# Patient Record
Sex: FEMALE | Race: WHITE | URBAN - METROPOLITAN AREA
[De-identification: names, ages, dates, MRNs, and addresses within clinical notes are randomized per-mention and may not be internally consistent; named-entity substitution may affect disease eponyms.]

---

## 2021-07-15 ENCOUNTER — HOSPITAL ENCOUNTER (OUTPATIENT)
Dept: PHYSICAL THERAPY | Age: 26
Setting detail: THERAPIES SERIES
Discharge: HOME | End: 2021-07-15
Payer: COMMERCIAL

## 2021-07-15 NOTE — FLOWSHEET NOTE
[facility], [facility],  Blanca Berman  [place], [address]  Phone: [phone]  Fax [phone]        Physical Therapy  Cancellation/No-show Note  Patient Name:  Carolina Campbell  :  1995   Date:  7/15/2021  Cancels to date: 1  No-shows to date: 0    For today's appointment patient:  [x] Cancelled  [] Rescheduled appointment  [] No-show     Reason given by patient:  [] Patient ill  [] Conflicting appointment  [x] No transportation    [] Conflict with work  [] No reason given  [] Other:     Comments:      Electronically signed by:  Yeyo Munoz PT, PT

## 2021-07-30 ENCOUNTER — HOSPITAL ENCOUNTER (OUTPATIENT)
Dept: PHYSICAL THERAPY | Age: 26
Setting detail: THERAPIES SERIES
Discharge: HOME | End: 2021-07-30
Payer: COMMERCIAL

## 2021-07-30 NOTE — FLOWSHEET NOTE
[facility], [facility], [address], [address]  Phone: [phone]  Fax [phone]        Physical Therapy  Cancellation/No-show Note  Patient Name:  Darian Snow  :  1995   Date:  2021  Cancels to date: 1  No-shows to date: 1    For today's appointment patient:  [] Cancelled  [] Rescheduled appointment  [x] No-show     Reason given by patient:  [] Patient ill  [] Conflicting appointment  [] No transportation    [] Conflict with work  [] No reason given  [] Other:     Comments:      Electronically signed by:  Ava Zamora, PT, PT

## 2021-08-26 ENCOUNTER — HOSPITAL ENCOUNTER (OUTPATIENT)
Dept: PHYSICAL THERAPY | Age: 26
Setting detail: THERAPIES SERIES
Discharge: HOME | End: 2021-08-26
Payer: COMMERCIAL

## 2021-08-26 PROCEDURE — 97530 THERAPEUTIC ACTIVITIES: CPT

## 2021-08-26 PROCEDURE — 97140 MANUAL THERAPY 1/> REGIONS: CPT

## 2021-08-26 PROCEDURE — 97161 PT EVAL LOW COMPLEX 20 MIN: CPT

## 2021-08-26 RX ORDER — FLUTICASONE PROPIONATE 50 MCG
1 SPRAY, SUSPENSION (ML) NASAL DAILY
COMMUNITY

## 2021-08-26 RX ORDER — ESCITALOPRAM OXALATE 20 MG/1
20 TABLET ORAL DAILY
COMMUNITY

## 2021-08-26 RX ORDER — LORATADINE 10 MG/1
10 TABLET ORAL DAILY
COMMUNITY

## 2021-08-26 NOTE — FLOWSHEET NOTE
[facility], [facility], [address] [place] [address]  Phone: [phone]  Fax [phone]      Physical Therapy Daily Treatment Note    Date:  2021    Patient Name:  Emeli Hogue    :  1995  MRN: [id number]  Restrictions/Precautions:  Universal   Medical/Treatment Diagnosis Information:  · Diagnosis: N39.46 Mixed Urinary Incontinence  Insurance/Certification information:  PT Insurance Information: [facility]  Physician Information:  Referring Practitioner: LUIS Gay  Plan of care signed (Y/N): N  Visit# / total visits:  1/10     Functional Outcome (if applicable):          PFDI: 2677/300      Time in:  10:30am      Timed Treatment: 70min. Total Treatment Time:  85min.  ________________________________________________________________________________________    Pain Level:    /10  SUBJECTIVE:  See eval    OBJECTIVE:     Exercise (TE) Resistance/Repetitions Other comments            PF strengthening                                  PF relaxation                                   Other Treatment:   TA:  Bladder re-training/education:     Bladder Diary: patient educated on importance of filling out bladder diary at home, complete with fluid intake, voids, and leakage when applicable. Voiding: patient educated on normal voiding and urinary cycle and the physiology of bladder control muscles and pelvic floor. The patient was educated on bladder dysfunction as well as SHWETA with urethral involvement. The patient was also educated on prolapse and it's affect on urination and pain. Dietary: patient educated on the \"4Cs\" to reduce bladder irritation and leakage.     Other:    Manual Treatments:   PERFECT        Test/Measurements:  See eval         ASSESSMENT: see eval        Treatment/Activity Tolerance:   [x] Patient tolerated treatment well [] Patient limited by fatique  [] Patient limited by pain [] Patient limited by other medical complications  [] Other:     Goals:      Patient stated goal: \"To not leak\"  []? Progressing: []? Met: []? Not Met: []? Adjusted        Therapist goals for Patient:   Short Term Goals: To be achieved in: 2 weeks  1. Independent in HEP and progression per patient tolerance, in order to prevent future occurrence of presenting issue. []? Progressing: []? Met: []? Not Met: []? Adjusted  2. Patient will have a decrease in urinary incontinence to indicate improvement in pelvic floor strength and relaxation, muscle coordination, and/or bladder retraining.  []? Progressing: []? Met: []? Not Met: []? Adjusted     Long Term Goals: To be achieved in: 10 weeks  1. Disability index score of 100 or less for the POPDI-6, CRADI-8, DAVIDA-6, and PFDI-20 to assist with reaching prior level of function. []? Progressing: []? Met: []? Not Met: []? Adjusted  2. Patient will report ability to tolerate penetration without increase in pain to progress towards intercourse / examinations without pain or limitation. []? Progressing: []? Met: []? Not Met: []? Adjusted  3. Patient will increase PERF score to 4+/5 with endurance of 10sec. to demonstrate increased strength and control over pelvic floor musculature. []? Progressing: []? Met: []? Not Met: []? Adjusted  4. Patient will consistently report using less than 1 pad per day to demonstrate decreased urinary incontinence and improve quality of life. []? Progressing: []? Met: []? Not Met: []? Adjusted  5. Patient will return to functional activities including walking, laughing and coughing without increased symptoms or restriction. []? Progressing: []? Met: []? Not Met: []?  Adjusted                        Plan: [x] Continue per plan of care [] Alter current plan (see comments)   [] Plan of care initiated [] Hold pending MD visit [] Discharge      Plan for Next Session: review diary      Re-Certification Due Date:   Visit 10      Signature:  Ernie Siddiqi, PT, DPT

## 2021-08-26 NOTE — FLOWSHEET NOTE
[facility] and Therapy, [facility], [address], [address]  Phone: [phone]  Fax [phone]                                                        Physical Therapy Certification    Dear Referring Practitioner: LUIS Vang,    We had the pleasure of evaluating the following patient for physical therapy services at [address]. A summary of our findings can be found in the initial assessment below. This includes our plan of care. If you have any questions or concerns regarding these findings, please do not hesitate to contact me at the office phone number checked above. Thank you for the referral.       Physician Signature:_______________________________Date:__________________  By signing above (or electronic signature), therapist's plan is approved by physician      Patient: Gladys Banks   : 1995   MRN: [id number]  Referring Physician: Referring Practitioner: LUIS Vang      Evaluation Date: 2021      Medical Diagnosis Information:  Diagnosis: N39.46 Mixed Urinary Incontinence                                             Insurance information:  Insurance Information: [facility]    Second person requested for examination:  [x] No    [] Yes   If yes, who was present:    Precautions/ Contra-indications: universal     Latex Allergy:  [x]NO      []YES    Preferred Language for Healthcare:   [x]English       []Other:    C-SSRS Triggered by Intake questionnaire (Past 2 wk assessment ):   [] No, Questionnaire did not trigger screening. [x] Yes, Patient intake triggered C-SSRS Screening     [x] Completed, no further action required. [] Completed, PCP notified via Epic      Functional Outcome: PFDI-20: 267/300         SUBJECTIVE: Patient reports \"I have always had some trouble with leakage for as long as I can remember, like even when I was in 6th grade\".   Reports now she has leakage mainly with coughing/sneezing. Reports she has loss of a full stream of urine and not just drops. Reports she does have urinary sensation at times and then will not have any urine come out. Reports she also has urinary leakage on the way to the bathroom or if she has waited too long. Reports stress and urge related incontinence. Reports occasionally she has IBS \"issues\" and has diarrhea. Reports she has hemorrhoids and strains to have bowel movements. Reports \"I always have to have a stock of supplies in case something happens\". Reports \"I do have pressure like I have to pee, but no pee comes out\". \"I sometimes feel like I have a UTI, but I haven't ever had one\". Reports she does have pain with intercourse, does have vaginal dryness as well. Reports pain \"is on the outside\". Reports frequent diagnosis of BV as a teenager. Denies history of sexual abuse/trauma. Urinary frequency? Daytime? YES Nighttime? NO  Bowel problems? Straining   Urinary or bowel leakage? Yes, both  Leakage frequency? Frequently   Protection:    Type: Pads (overnight/extra long)   #changes/day: 3x/day  Pregnancy:   # of pregnancies: 0     Are you having regular menstrual cycles? Just started Birth control   Date of last pap smear? 7/2021       4 week or greater of failed trial of PFPT program?   [x] No    [] Yes    PFPT program as defined by \"Completing 4 weeks of an ordered plan of pelvic muscle exercises designed to increase periurethral muscle strength\".     Relevant Medical History: see medical chart, reviewed with patient     Pain Scale: 0/10     Numbness/Tingling: (B) feet,     Occupation/School: [employer]    Living Status/Prior Level of Function: Prior to this injury / incident, pt was independent with ADLs and IADLs    OBJECTIVE:     Abdominals:  Scarring:   [x] No    [] Yes  Diastasis:   [x] No    [] Yes  Functional stability:   [x] No    [] Yes    Observation:   Posture: WFL   Gait analysis: WFL  Lumbar Mobility: WFL      Special Tests:  Sacroiliac Test:   Gaenslen: negative      Lumbar Spine:   Slump test: negative       Neuro:   Sensation: (B) UEs: intact to light touch   Sensation: (B) LEs: intact to light touch   Anal Sensation:    S5 intact to light touch    S4 intact to light touch    S3 intact to light touch   Reflexes:     Anal: present    Cough: present    Pelvic Floor Exam  External:  Skin Condition: normal   Sensation: intact  Palpation: no point tenderness noted  Tone: normal  Perineal Body Mobility:    Voluntary PFM Contraction: present (normal)   Voluntary PFM Relaxation: present (normal)   Involuntary PFM Contraction/Cough Reflex: present (normal)   Involuntary PFM Relaxation: present (normal)  Perineal Body Descent:   Rest: supported   Bearing down: absent (normal-cephalad)    Internal:  Sensation: intact  Palpation: no point tenderness   Vaginal Vault Size:  WNL  PERFECT:   Power: 4/5   Endurance: 8sec. Repetitions: 2   Fast Twitch: 5   Elevation: present   Co-contraction: present   Timing: present    Pelvic Organ Prolapse: grade 1 cystocele                          [x] Patient history, allergies, meds reviewed. Medical chart reviewed. See intake form. Review Of Systems (ROS):  [x]Performed Review of systems (Integumentary, CardioPulmonary, Neurological) by intake and observation. Intake form has been scanned into medical record. Patient has been instructed to contact their primary care physician regarding ROS issues if not already being addressed at this time.       Co-morbidities/Complexities (which will affect course of rehabilitation):   []None        []Hx of COVID   Arthritic conditions   []Rheumatoid arthritis (M05.9)  []Osteoarthritis (M19.91)  []Gout   Cardiovascular conditions   []Hypertension (I10)  []Hyperlipidemia (E78.5)  []Angina pectoris (I20)  []Atherosclerosis (I70)  []Pacemaker  []Hx of CABG/stent/  cardiac surgeries   Musculoskeletal conditions   []Disc pathology   []Congenital spine pathologies   []Osteoporosis (M81.8)  []Osteopenia (M85.8)  []Scoliosis       Endocrine conditions   []Hypothyroid (E03.9)  []Hyperthyroid Gastrointestinal conditions   []Constipation (L19.38)   Metabolic conditions   []Morbid obesity (E66.01)  []Diabetes type 1(E10.65) or 2 (E11.65)   []Neuropathy (G60.9)     Cardio/Pulmonary conditions   [x]Asthma (J45)  []Coughing   []COPD (J44.9)  []CHF  []A-fib   Psychological Disorders  [x]Anxiety (F41.9)  [x]Depression (F32.9)   []Other:   Developmental Disorders  []Autism (F84.0)  []CP (G80)  []Down Syndrome (Q90.9)  []Developmental delay     Neurological conditions  []Prior Stroke (I69.30)  []Parkinson's (G20)  []Encephalopathy (G93.40)  []MS (G35)  []Post-polio (G14)  []SCI  []TBI  []ALS Other conditions  []Fibromyalgia (M79.7)  []Vertigo  []Syncope  []Kidney Failure  []Cancer      []currently undergoing                treatment  []Pregnancy  []Incontinence   Prior surgeries  []involved limb  []previous spinal surgery  [] section birth  []hysterectomy  []bowel / bladder surgery  []other relevant surgeries   []Other:              Barriers to/and or personal factors that will affect rehab potential:              []Age  []Sex   []Smoker              []Motivation/Lack of Motivation                        []Co-Morbidities              []Cognitive Function, education/learning barriers              []Environmental, home barriers              []profession/work barriers  []past PT/medical experience  []other:  Justification: none    Falls Risk Assessment (30 days):   [x] Falls Risk assessed and no intervention required. [] Falls Risk assessed and Patient requires intervention due to being higher risk   TUG score (>12s at risk):     [] Falls education provided, including         ASSESSMENT: The patient is a 19yo female referred to PT due to urinary incontinence. The patient demonstrated decreased pelvic floor endurance and coordination.   The patient is reporting mixed urinary incontinence. The patient is reporting s/s consistent with IC/PBS. The patient seems very motivated and should improve with PT and HEP compliance.       Functional Impairments:    []Noted lumbar/proximal hip hypomobility  []Noted lumbosacral and/or generalized hypermobility  []Decreased core/proximal hip strength and neuromuscular control   []Decreased LE functional strength  []pelvic/sacral/spinal malalignment   []Increased pain with penetration  [x]Absent/poor control of PF contraction   []Absent/poor control of PF relaxation  [x]Decreased control of bladder  [x]Decreased control of bowel    Functional Activity Limitations (from functional questionnaire and intake)  []Reduced ability to maintain good posture and demonstrate good body mechanics with sitting, bending, and lifting  []Reduced ability to perform lifting, reaching, carrying tasks  [x]Reduced ability to control urine  [x]Reduced ability to control bowel movements   []Reduced ability to ambulate prolonged functional periods/distances/surfaces  []Reduced ability to squat   []Reduced ability to forward bend  []Reduced ability to ascend/descend stairs  []Reduced ability to tolerate penetration/intercourse    Participation Restrictions  [x]Reduced participation in self care activities  [x]Reduced participation in home management activities  [x]Reduced participation in work activities  [x]Reduced participation in social activities  [x]Reduced participation in sport/recreational activities    Classification/Subgrouping:  []signs/symptoms consistent vaginismus/dyspareunia    []signs/symptoms consistent with pelvic floor organ prolapse  [x]signs/symptoms consistent with stress urinary incontinence  [x]signs/symptoms consistent with urge urinary incontinence  []signs/symptoms consistent with bowel incontinence  []signs/symptoms consistent with post-surgical status including decreased ROM, strength and function  []signs/symptoms consistent with other: education on pelvic floor anatomy and function, bladder and bowel anatomy and function, joint protection, postural re-education, activity modification, progression of HEP. HEP instruction: Written HEP instructions provided and reviewed    GOALS:  Patient stated goal: \"To not leak\"  [] Progressing: [] Met: [] Not Met: [] Adjusted      Therapist goals for Patient:   Short Term Goals: To be achieved in: 2 weeks  1. Independent in HEP and progression per patient tolerance, in order to prevent future occurrence of presenting issue. [] Progressing: [] Met: [] Not Met: [] Adjusted  2. Patient will have a decrease in urinary incontinence to indicate improvement in pelvic floor strength and relaxation, muscle coordination, and/or bladder retraining. [] Progressing: [] Met: [] Not Met: [] Adjusted    Long Term Goals: To be achieved in: 10 weeks  1. Disability index score of 100 or less for the POPDI-6, CRADI-8, DAVIDA-6, and PFDI-20 to assist with reaching prior level of function. [] Progressing: [] Met: [] Not Met: [] Adjusted  2. Patient will report ability to tolerate penetration without increase in pain to progress towards intercourse / examinations without pain or limitation. [] Progressing: [] Met: [] Not Met: [] Adjusted  3. Patient will increase PERF score to 4+/5 with endurance of 10sec. to demonstrate increased strength and control over pelvic floor musculature. [] Progressing: [] Met: [] Not Met: [] Adjusted  4. Patient will consistently report using less than 1 pad per day to demonstrate decreased urinary incontinence and improve quality of life. [] Progressing: [] Met: [] Not Met: [] Adjusted  5. Patient will return to functional activities including walking, laughing and coughing without increased symptoms or restriction.    [] Progressing: [] Met: [] Not Met: [] Adjusted       Electronically signed by:  Francie Omalley PT, DPT

## 2021-09-09 ENCOUNTER — HOSPITAL ENCOUNTER (OUTPATIENT)
Dept: PHYSICAL THERAPY | Age: 26
Setting detail: THERAPIES SERIES
Discharge: HOME | End: 2021-09-09
Payer: COMMERCIAL

## 2021-09-09 NOTE — FLOWSHEET NOTE
[facility] [facility] Therapy, [facility], [address], [address]  Phone: [phone]  Fax [phone]        Physical Therapy  Cancellation/No-show Note  Patient Name:  Thi Estrada  :  1995   Date:  2021  Cancels to date: 0  No-shows to date: 1    For today's appointment patient:  [] Cancelled  [] Rescheduled appointment  [x] No-show     Reason given by patient:  [] Patient ill  [] Conflicting appointment  [] No transportation    [] Conflict with work  [] No reason given  [] Other:     Comments:      Electronically signed by:  Juanito Camejo PT

## 2021-09-17 ENCOUNTER — HOSPITAL ENCOUNTER (OUTPATIENT)
Dept: PHYSICAL THERAPY | Age: 26
Setting detail: THERAPIES SERIES
Discharge: HOME | End: 2021-09-17
Payer: COMMERCIAL

## 2021-09-17 NOTE — FLOWSHEET NOTE
[facility] [place] [address] [address], [address]  Phone: [phone]  Fax [phone]        Physical Therapy  Cancellation/No-show Note  Patient Name:  Otilio Diehl  :  1995   Date:  2021  Cancels to date: 1  No-shows to date: 1    For today's appointment patient:  [x] Cancelled  [] Rescheduled appointment  [] No-show     Reason given by patient:  [] Patient ill  [] Conflicting appointment  [] No transportation    [] Conflict with work  [] No reason given  [x] Other:     Comments: Called patient to see if she would be attending today's follow up, patient requested all follow up appointments be cancelled due to personal issues at this time. No goals met, no updated objective information.      Electronically signed by:  Ernie Siddiqi, PT, DPT

## 2023-06-27 ENCOUNTER — OFFICE VISIT (OUTPATIENT)
Age: 28
End: 2023-06-27

## 2023-06-27 VITALS
OXYGEN SATURATION: 98 % | TEMPERATURE: 100.3 F | SYSTOLIC BLOOD PRESSURE: 110 MMHG | DIASTOLIC BLOOD PRESSURE: 72 MMHG | WEIGHT: 213 LBS | BODY MASS INDEX: 37.73 KG/M2 | HEART RATE: 93 BPM

## 2023-06-27 DIAGNOSIS — R50.9 FEVER OF UNKNOWN ORIGIN: Primary | ICD-10-CM

## 2023-06-27 DIAGNOSIS — J02.9 SORE THROAT: ICD-10-CM

## 2023-06-27 LAB — S PYO AG THROAT QL: NORMAL

## 2023-06-27 ASSESSMENT — ENCOUNTER SYMPTOMS
NAUSEA: 0
VOMITING: 0
DIARRHEA: 0
RHINORRHEA: 0
SORE THROAT: 1

## 2024-04-16 ENCOUNTER — HOSPITAL ENCOUNTER (EMERGENCY)
Age: 29
Discharge: HOME | End: 2024-04-16
Attending: EMERGENCY MEDICINE
Payer: COMMERCIAL

## 2024-04-16 VITALS
OXYGEN SATURATION: 100 % | TEMPERATURE: 98 F | SYSTOLIC BLOOD PRESSURE: 106 MMHG | RESPIRATION RATE: 16 BRPM | HEART RATE: 86 BPM | WEIGHT: 165 LBS | DIASTOLIC BLOOD PRESSURE: 68 MMHG | BODY MASS INDEX: 29.23 KG/M2

## 2024-04-16 DIAGNOSIS — K52.9 GASTROENTERITIS: Primary | ICD-10-CM

## 2024-04-16 LAB
ALBUMIN SERPL-MCNC: 4.8 G/DL (ref 3.4–5)
ALBUMIN/GLOB SERPL: 2 {RATIO} (ref 1.1–2.2)
ALP SERPL-CCNC: 67 U/L (ref 40–129)
ALT SERPL-CCNC: 10 U/L (ref 10–40)
ANION GAP SERPL CALCULATED.3IONS-SCNC: 15 MMOL/L (ref 3–16)
AST SERPL-CCNC: 15 U/L (ref 15–37)
BASOPHILS # BLD: 0 K/UL (ref 0–0.2)
BASOPHILS NFR BLD: 0.1 %
BILIRUB SERPL-MCNC: 0.5 MG/DL (ref 0–1)
BILIRUB UR QL STRIP.AUTO: NEGATIVE
BUN SERPL-MCNC: 11 MG/DL (ref 7–20)
CALCIUM SERPL-MCNC: 9.4 MG/DL (ref 8.3–10.6)
CHLORIDE SERPL-SCNC: 101 MMOL/L (ref 99–110)
CLARITY UR: CLEAR
CO2 SERPL-SCNC: 23 MMOL/L (ref 21–32)
COLOR UR: YELLOW
CREAT SERPL-MCNC: 0.6 MG/DL (ref 0.6–1.1)
DEPRECATED RDW RBC AUTO: 15.7 % (ref 12.4–15.4)
EOSINOPHIL # BLD: 0 K/UL (ref 0–0.6)
EOSINOPHIL NFR BLD: 0 %
FLUAV RNA UPPER RESP QL NAA+PROBE: NEGATIVE
FLUBV AG NPH QL: NEGATIVE
GFR SERPLBLD CREATININE-BSD FMLA CKD-EPI: >90 ML/MIN/{1.73_M2}
GLUCOSE SERPL-MCNC: 126 MG/DL (ref 70–99)
GLUCOSE UR STRIP.AUTO-MCNC: NEGATIVE MG/DL
HCG SERPL QL: NEGATIVE
HCT VFR BLD AUTO: 39.5 % (ref 36–48)
HGB BLD-MCNC: 13 G/DL (ref 12–16)
HGB UR QL STRIP.AUTO: NEGATIVE
KETONES UR STRIP.AUTO-MCNC: 15 MG/DL
LEUKOCYTE ESTERASE UR QL STRIP.AUTO: NEGATIVE
LIPASE SERPL-CCNC: 14 U/L (ref 13–60)
LYMPHOCYTES # BLD: 0.3 K/UL (ref 1–5.1)
LYMPHOCYTES NFR BLD: 2.2 %
MCH RBC QN AUTO: 26.2 PG (ref 26–34)
MCHC RBC AUTO-ENTMCNC: 32.9 G/DL (ref 31–36)
MCV RBC AUTO: 79.8 FL (ref 80–100)
MONOCYTES # BLD: 0.3 K/UL (ref 0–1.3)
MONOCYTES NFR BLD: 2.6 %
NEUTROPHILS # BLD: 10.8 K/UL (ref 1.7–7.7)
NEUTROPHILS NFR BLD: 95.1 %
NITRITE UR QL STRIP.AUTO: NEGATIVE
PH UR STRIP.AUTO: 7.5 [PH] (ref 5–8)
PLATELET # BLD AUTO: 294 K/UL (ref 135–450)
PMV BLD AUTO: 9 FL (ref 5–10.5)
POTASSIUM SERPL-SCNC: 3.6 MMOL/L (ref 3.5–5.1)
PROT SERPL-MCNC: 7.2 G/DL (ref 6.4–8.2)
PROT UR STRIP.AUTO-MCNC: NEGATIVE MG/DL
RBC # BLD AUTO: 4.95 M/UL (ref 4–5.2)
SODIUM SERPL-SCNC: 139 MMOL/L (ref 136–145)
SP GR UR STRIP.AUTO: 1.01 (ref 1–1.03)
UA COMPLETE W REFLEX CULTURE PNL UR: ABNORMAL
UA DIPSTICK W REFLEX MICRO PNL UR: ABNORMAL
URN SPEC COLLECT METH UR: ABNORMAL
UROBILINOGEN UR STRIP-ACNC: 0.2 E.U./DL
WBC # BLD AUTO: 11.4 K/UL (ref 4–11)

## 2024-04-16 PROCEDURE — 6360000002 HC RX W HCPCS: Performed by: EMERGENCY MEDICINE

## 2024-04-16 PROCEDURE — 85025 COMPLETE CBC W/AUTO DIFF WBC: CPT

## 2024-04-16 PROCEDURE — 96375 TX/PRO/DX INJ NEW DRUG ADDON: CPT

## 2024-04-16 PROCEDURE — 87635 SARS-COV-2 COVID-19 AMP PRB: CPT

## 2024-04-16 PROCEDURE — 2500000003 HC RX 250 WO HCPCS: Performed by: EMERGENCY MEDICINE

## 2024-04-16 PROCEDURE — 83690 ASSAY OF LIPASE: CPT

## 2024-04-16 PROCEDURE — 2580000003 HC RX 258: Performed by: EMERGENCY MEDICINE

## 2024-04-16 PROCEDURE — 80053 COMPREHEN METABOLIC PANEL: CPT

## 2024-04-16 PROCEDURE — 96374 THER/PROPH/DIAG INJ IV PUSH: CPT

## 2024-04-16 PROCEDURE — A4216 STERILE WATER/SALINE, 10 ML: HCPCS | Performed by: EMERGENCY MEDICINE

## 2024-04-16 PROCEDURE — 99284 EMERGENCY DEPT VISIT MOD MDM: CPT

## 2024-04-16 PROCEDURE — 87804 INFLUENZA ASSAY W/OPTIC: CPT

## 2024-04-16 PROCEDURE — 81003 URINALYSIS AUTO W/O SCOPE: CPT

## 2024-04-16 PROCEDURE — 84703 CHORIONIC GONADOTROPIN ASSAY: CPT

## 2024-04-16 RX ORDER — LOPERAMIDE HYDROCHLORIDE 2 MG/1
2 CAPSULE ORAL 4 TIMES DAILY PRN
Qty: 10 CAPSULE | Refills: 0 | Status: SHIPPED | OUTPATIENT
Start: 2024-04-16 | End: 2024-04-26

## 2024-04-16 RX ORDER — 0.9 % SODIUM CHLORIDE 0.9 %
1000 INTRAVENOUS SOLUTION INTRAVENOUS ONCE
Status: COMPLETED | OUTPATIENT
Start: 2024-04-16 | End: 2024-04-16

## 2024-04-16 RX ORDER — ONDANSETRON HYDROCHLORIDE 8 MG/1
8 TABLET, FILM COATED ORAL EVERY 8 HOURS PRN
Qty: 20 TABLET | Refills: 0 | Status: SHIPPED | OUTPATIENT
Start: 2024-04-16

## 2024-04-16 RX ORDER — CYCLOBENZAPRINE HCL 10 MG
10 TABLET ORAL 3 TIMES DAILY PRN
Qty: 15 TABLET | Refills: 0 | Status: SHIPPED | OUTPATIENT
Start: 2024-04-16 | End: 2024-04-26

## 2024-04-16 RX ORDER — ONDANSETRON 2 MG/ML
4 INJECTION INTRAMUSCULAR; INTRAVENOUS ONCE
Status: COMPLETED | OUTPATIENT
Start: 2024-04-16 | End: 2024-04-16

## 2024-04-16 RX ADMIN — SODIUM CHLORIDE 1000 ML: 9 INJECTION, SOLUTION INTRAVENOUS at 20:56

## 2024-04-16 RX ADMIN — ONDANSETRON 4 MG: 2 INJECTION INTRAMUSCULAR; INTRAVENOUS at 20:56

## 2024-04-16 RX ADMIN — FAMOTIDINE 20 MG: 10 INJECTION, SOLUTION INTRAVENOUS at 20:58

## 2024-04-16 ASSESSMENT — PAIN SCALES - GENERAL
PAINLEVEL_OUTOF10: 4
PAINLEVEL_OUTOF10: 8
PAINLEVEL_OUTOF10: 2

## 2024-04-16 ASSESSMENT — PAIN - FUNCTIONAL ASSESSMENT
PAIN_FUNCTIONAL_ASSESSMENT: 0-10
PAIN_FUNCTIONAL_ASSESSMENT: 0-10

## 2024-04-16 ASSESSMENT — PAIN DESCRIPTION - DESCRIPTORS: DESCRIPTORS: ACHING;CRAMPING

## 2024-04-16 ASSESSMENT — PAIN DESCRIPTION - FREQUENCY: FREQUENCY: CONTINUOUS

## 2024-04-16 ASSESSMENT — PAIN DESCRIPTION - LOCATION: LOCATION: ABDOMEN

## 2024-04-16 ASSESSMENT — PAIN DESCRIPTION - PAIN TYPE: TYPE: ACUTE PAIN

## 2024-04-17 LAB — SARS-COV-2 RNA RESP QL NAA+PROBE: NOT DETECTED

## 2024-04-17 NOTE — ED NOTES
Pt arrived complaining of abdominal pain, n/v/d since yesterday. Pt reports pain 8/10. She is alert and oriented, respirations appear even and unlabored. She does seem slightly anxious and upset due to missing a scheduled doctor's appt. She reports pain, n/v/d beginning approx 5-6 hours after eating a homemade BLT. Pt has not had any emesis since arrival.    20 G PIV placed in right AC according to protocol. Blood samples collected and sent for processing. Pt tolerated well. Medication administered according to policy & physician's orders.    Call light is within reach, pt verbalizes understanding on usage. Pt denies further needs at this time.

## 2024-04-17 NOTE — DISCHARGE INSTRUCTIONS
Discharge home  Flexeril for your leg cramps  Continue your GERD medications  Zofran for nausea  Imodium for diarrhea  Drink plenty of fluids  Follow-up with your family doctor  Follow-up with [facility]

## 2024-04-17 NOTE — ED PROVIDER NOTES
Reviewed   URINALYSIS WITH REFLEX TO CULTURE - Abnormal; Notable for the following components:       Result Value    Ketones, Urine 15 (*)     All other components within normal limits   CBC WITH AUTO DIFFERENTIAL - Abnormal; Notable for the following components:    WBC 11.4 (*)     MCV 79.8 (*)     RDW 15.7 (*)     Neutrophils Absolute 10.8 (*)     Lymphocytes Absolute 0.3 (*)     All other components within normal limits   COMPREHENSIVE METABOLIC PANEL W/ REFLEX TO MG FOR LOW K - Abnormal; Notable for the following components:    Glucose 126 (*)     All other components within normal limits   RAPID INFLUENZA A/B ANTIGENS   HCG, SERUM, QUALITATIVE   LIPASE   COVID-19       All other labs were within normal range or not returned as of this dictation.    EKG: All EKG's are interpreted by the Emergency Department Physician who eithersigns or Co-signs this chart in the absence of a cardiologist.        RADIOLOGY:   Non-plain film images such as CT, Ultrasound and MRI are read by the radiologist. Plain radiographic images are visualized by myself.      *    Interpretation per the Radiologist below, if available at the time of this note:    No orders to display         PROCEDURES   Unless otherwise noted below, none     Procedures    *    CRITICAL CARE TIME   N/A      EMERGENCY DEPARTMENT COURSE and DIFFERENTIALDIAGNOSIS/MDM:   Vitals:    Vitals:    04/16/24 2040 04/16/24 2135   BP: (!) 129/90 120/82   Pulse: 92 89   Resp: 16 16   Temp: 98 °F (36.7 °C)    TempSrc: Oral    SpO2: 99% 100%   Weight: 74.8 kg (165 lb)        Patient was given thefollowing medications:  Medications   sodium chloride 0.9 % bolus 1,000 mL (1,000 mLs IntraVENous New Bag 4/16/24 2056)   ondansetron (ZOFRAN) injection 4 mg (4 mg IntraVENous Given 4/16/24 2056)   famotidine (PEPCID) 20 mg in sodium chloride (PF) 0.9 % 10 mL injection (20 mg IntraVENous Given 4/16/24 2058)           The patient tolerated their visit well.   The patient and / or the

## 2024-05-07 ENCOUNTER — HOSPITAL ENCOUNTER (EMERGENCY)
Age: 29
Discharge: HOME | End: 2024-05-07
Attending: EMERGENCY MEDICINE
Payer: COMMERCIAL

## 2024-05-07 VITALS
BODY MASS INDEX: 28.93 KG/M2 | RESPIRATION RATE: 16 BRPM | OXYGEN SATURATION: 100 % | SYSTOLIC BLOOD PRESSURE: 144 MMHG | DIASTOLIC BLOOD PRESSURE: 81 MMHG | HEART RATE: 91 BPM | TEMPERATURE: 98.1 F | WEIGHT: 163.3 LBS

## 2024-05-07 DIAGNOSIS — L29.0 ANAL ITCHING: Primary | ICD-10-CM

## 2024-05-07 PROCEDURE — 99283 EMERGENCY DEPT VISIT LOW MDM: CPT

## 2024-05-07 RX ORDER — ALBENDAZOLE 200 MG/1
400 TABLET, FILM COATED ORAL ONCE
Qty: 4 TABLET | Refills: 0 | Status: SHIPPED | OUTPATIENT
Start: 2024-05-07 | End: 2024-05-07

## 2024-05-07 RX ORDER — DEXTROAMPHETAMINE SACCHARATE, AMPHETAMINE ASPARTATE, DEXTROAMPHETAMINE SULFATE AND AMPHETAMINE SULFATE 2.5; 2.5; 2.5; 2.5 MG/1; MG/1; MG/1; MG/1
10 TABLET ORAL DAILY
COMMUNITY

## 2024-05-07 ASSESSMENT — PAIN - FUNCTIONAL ASSESSMENT: PAIN_FUNCTIONAL_ASSESSMENT: NONE - DENIES PAIN

## 2024-05-08 NOTE — ED PROVIDER NOTES
EMERGENCY DEPARTMENT ENCOUNTER     [facility] EMERGENCY DEPARTMENT     Pt Name: Rose Coyle   MRN: [id number]   Birthdate 1995   Date of evaluation: 5/7/2024   Provider: Kavita Newell MD   PCP: Tamara Kirby   Note Started: 11:01 PM EDT 5/7/24     CHIEF COMPLAINT     Chief Complaint   Patient presents with    Worms in Stool     Pt states she's had worms in her stool since she was 8 years old. Also has an \"itchy butt hole.\" No pain.        HISTORY OF PRESENT ILLNESS:          Rose Coyle is a 28 y.o. female who presents with a chief complaint of worms in her stools. She has anal pruritus. Last night she had diarrhea. Has had worms in stools she was 8 and she was never treated. She told her mother and she responded negatively and she became embarrassed to tell anyone about that. She has seen the worms occasionally.      Nursing Notes were all reviewed and agreed with or any disagreements were addressed in the HPI.     ROS: Positives and Pertinent negatives as per HPI.    PAST MEDICAL HISTORY     Past medical history:  has a past medical history of Anxiety, Asthma, Depression, GERD (gastroesophageal reflux disease), and Headache.    Past surgical history:  has a past surgical history that includes Tonsillectomy; Tympanostomy tube placement; Adenoidectomy; and Rel of Tongue Tie and Closure with Flap.      PHYSICAL EXAM:  ED Triage Vitals [05/07/24 2243]   BP Temp Temp Source Pulse Respirations SpO2 Height Weight - Scale   (!) 144/81 98.1 °F (36.7 °C) Oral 91 16 100 % -- 74.1 kg (163 lb 4.8 oz)        Physical Exam  Vitals and nursing note reviewed.   Constitutional:       Appearance: Normal appearance. She is well-developed. She is not ill-appearing.   HENT:      Head: Normocephalic and atraumatic.      Right Ear: External ear normal.      Left Ear: External ear normal.      Nose: Nose normal.   Eyes:      General: No scleral icterus.        Right eye: No discharge.         Left eye: No

## 2024-07-02 ENCOUNTER — HOSPITAL ENCOUNTER (EMERGENCY)
Age: 29
Discharge: LEFT BEFORE DISCHARGE | End: 2024-07-02
Attending: EMERGENCY MEDICINE

## 2024-07-02 VITALS
TEMPERATURE: 97.9 F | HEART RATE: 81 BPM | SYSTOLIC BLOOD PRESSURE: 117 MMHG | DIASTOLIC BLOOD PRESSURE: 85 MMHG | WEIGHT: 163.14 LBS | OXYGEN SATURATION: 99 % | HEIGHT: 63 IN | BODY MASS INDEX: 28.91 KG/M2 | RESPIRATION RATE: 16 BRPM

## 2024-07-02 DIAGNOSIS — M25.519 SHOULDER PAIN, UNSPECIFIED CHRONICITY, UNSPECIFIED LATERALITY: Primary | ICD-10-CM

## 2024-07-02 ASSESSMENT — PAIN DESCRIPTION - DESCRIPTORS: DESCRIPTORS: BURNING;NUMBNESS;TINGLING

## 2024-07-02 ASSESSMENT — PAIN DESCRIPTION - ORIENTATION: ORIENTATION: LEFT

## 2024-07-02 ASSESSMENT — PAIN DESCRIPTION - LOCATION: LOCATION: SHOULDER

## 2024-07-02 ASSESSMENT — PAIN SCALES - GENERAL: PAINLEVEL_OUTOF10: 5

## 2024-07-02 ASSESSMENT — PAIN - FUNCTIONAL ASSESSMENT: PAIN_FUNCTIONAL_ASSESSMENT: 0-10

## 2024-07-22 ENCOUNTER — OFFICE VISIT (OUTPATIENT)
Age: 29
End: 2024-07-22

## 2024-07-22 VITALS
WEIGHT: 160 LBS | SYSTOLIC BLOOD PRESSURE: 124 MMHG | TEMPERATURE: 98.2 F | OXYGEN SATURATION: 99 % | BODY MASS INDEX: 28.34 KG/M2 | HEART RATE: 81 BPM | DIASTOLIC BLOOD PRESSURE: 72 MMHG

## 2024-07-22 DIAGNOSIS — K04.7 DENTAL ABSCESS: Primary | ICD-10-CM

## 2024-07-22 RX ORDER — AMOXICILLIN 875 MG/1
875 TABLET, COATED ORAL 2 TIMES DAILY
Qty: 20 TABLET | Refills: 0 | Status: SHIPPED | OUTPATIENT
Start: 2024-07-22 | End: 2024-08-01

## 2024-07-22 NOTE — PROGRESS NOTES
Rose Coyle (:  1995) is a 29 y.o. female,Established patient, here for evaluation of the following chief complaint(s):  Dental Pain      ASSESSMENT/PLAN:    ICD-10-CM    1. Dental abscess  K04.7           Dx Disposition:dental abscess   Education and handout provided on diagnosis and management of symptoms.   AVS reviewed with patient. Follow up as needed in UC or with PCP for new or worsening symptoms.   Return if symptoms worsen or fail to improve.  Pt has multiple complaints and wants to be treated for everything explained that we are only able to see her for one complaint and pt wants to be seen for everything and is blaming me for not treating all of her issues suggested with all her chronic problems that she see her family doctor on a regular basis with all her issues.  Pt still not happy and said don't make me play the Autism card explained that no one has refused to treat her and she can choose which complaint she would like to be treated for today    SUBJECTIVE/OBJECTIVE:  Patient presents today with complaints of dental infection that started 3 days ago also has infection in finger and all over her body      History provided by:  Patient   used: No    Dental Pain         Vitals:    24 1709 24 1722   BP: (!) 152/102 124/72   Site: Right Upper Arm    Position: Sitting    Cuff Size: Medium Adult    Pulse: 81    Temp: 98.2 °F (36.8 °C)    TempSrc: Oral    SpO2: 99%    Weight: 72.6 kg (160 lb)        Review of Systems    Physical Exam  Constitutional:       Appearance: Normal appearance.   HENT:      Nose: Nose normal.      Mouth/Throat:      Mouth: Mucous membranes are moist.      Pharynx: Oropharynx is clear.   Cardiovascular:      Rate and Rhythm: Normal rate and regular rhythm.      Heart sounds: Normal heart sounds.   Pulmonary:      Effort: Pulmonary effort is normal.   Musculoskeletal:         General: Normal range of motion.   Skin:     General: Skin is

## 2024-09-28 ENCOUNTER — APPOINTMENT (OUTPATIENT)
Dept: GENERAL RADIOLOGY | Age: 29
DRG: 752 | End: 2024-09-28
Payer: COMMERCIAL

## 2024-09-28 ENCOUNTER — HOSPITAL ENCOUNTER (INPATIENT)
Age: 29
LOS: 3 days | Discharge: HOME | DRG: 752 | End: 2024-10-02
Attending: EMERGENCY MEDICINE | Admitting: PSYCHIATRY & NEUROLOGY
Payer: COMMERCIAL

## 2024-09-28 DIAGNOSIS — F90.0 ATTENTION DEFICIT HYPERACTIVITY DISORDER (ADHD), PREDOMINANTLY INATTENTIVE TYPE: Primary | ICD-10-CM

## 2024-09-28 DIAGNOSIS — R45.851 SUICIDAL IDEATION: ICD-10-CM

## 2024-09-28 LAB
ALBUMIN SERPL-MCNC: 4.3 G/DL (ref 3.4–5)
ALBUMIN/GLOB SERPL: 1.5 {RATIO} (ref 1.1–2.2)
ALP SERPL-CCNC: 57 U/L (ref 40–129)
ALT SERPL-CCNC: 14 U/L (ref 10–40)
AMPHETAMINES UR QL SCN>1000 NG/ML: ABNORMAL
ANION GAP SERPL CALCULATED.3IONS-SCNC: 12 MMOL/L (ref 3–16)
APAP SERPL-MCNC: <5 UG/ML (ref 10–30)
AST SERPL-CCNC: 16 U/L (ref 15–37)
BACTERIA URNS QL MICRO: ABNORMAL /HPF
BARBITURATES UR QL SCN>200 NG/ML: ABNORMAL
BASOPHILS # BLD: 0.1 K/UL (ref 0–0.2)
BASOPHILS NFR BLD: 0.6 %
BENZODIAZ UR QL SCN>200 NG/ML: ABNORMAL
BILIRUB SERPL-MCNC: <0.2 MG/DL (ref 0–1)
BILIRUB UR QL STRIP.AUTO: NEGATIVE
BUN SERPL-MCNC: 10 MG/DL (ref 7–20)
CALCIUM SERPL-MCNC: 8.3 MG/DL (ref 8.3–10.6)
CANNABINOIDS UR QL SCN>50 NG/ML: POSITIVE
CHLORIDE SERPL-SCNC: 105 MMOL/L (ref 99–110)
CLARITY UR: CLEAR
CO2 SERPL-SCNC: 22 MMOL/L (ref 21–32)
COARSE GRAN CASTS #/AREA URNS LPF: ABNORMAL /LPF (ref 0–2)
COCAINE UR QL SCN: ABNORMAL
COLOR UR: YELLOW
CREAT SERPL-MCNC: 0.7 MG/DL (ref 0.6–1.1)
DEPRECATED RDW RBC AUTO: 14.6 % (ref 12.4–15.4)
DRUG SCREEN COMMENT UR-IMP: ABNORMAL
EOSINOPHIL # BLD: 0.2 K/UL (ref 0–0.6)
EOSINOPHIL NFR BLD: 2.6 %
EPI CELLS #/AREA URNS HPF: ABNORMAL /HPF (ref 0–5)
ETHANOLAMINE SERPL-MCNC: NORMAL MG/DL (ref 0–0.08)
FENTANYL SCREEN, URINE: ABNORMAL
GFR SERPLBLD CREATININE-BSD FMLA CKD-EPI: >90 ML/MIN/{1.73_M2}
GLUCOSE SERPL-MCNC: 116 MG/DL (ref 70–99)
GLUCOSE UR STRIP.AUTO-MCNC: NEGATIVE MG/DL
HCG UR QL: NEGATIVE
HCT VFR BLD AUTO: 30.2 % (ref 36–48)
HGB BLD-MCNC: 9.7 G/DL (ref 12–16)
HGB UR QL STRIP.AUTO: NEGATIVE
KETONES UR STRIP.AUTO-MCNC: NEGATIVE MG/DL
LEUKOCYTE ESTERASE UR QL STRIP.AUTO: NEGATIVE
LIPASE SERPL-CCNC: 31 U/L (ref 13–60)
LYMPHOCYTES # BLD: 1.5 K/UL (ref 1–5.1)
LYMPHOCYTES NFR BLD: 15.8 %
MAGNESIUM SERPL-MCNC: 2 MG/DL (ref 1.8–2.4)
MCH RBC QN AUTO: 25 PG (ref 26–34)
MCHC RBC AUTO-ENTMCNC: 32.3 G/DL (ref 31–36)
MCV RBC AUTO: 77.5 FL (ref 80–100)
METHADONE UR QL SCN>300 NG/ML: ABNORMAL
MONOCYTES # BLD: 0.5 K/UL (ref 0–1.3)
MONOCYTES NFR BLD: 5.4 %
NEUTROPHILS # BLD: 7.3 K/UL (ref 1.7–7.7)
NEUTROPHILS NFR BLD: 75.6 %
NITRITE UR QL STRIP.AUTO: NEGATIVE
OPIATES UR QL SCN>300 NG/ML: ABNORMAL
OXYCODONE UR QL SCN: ABNORMAL
PCP UR QL SCN>25 NG/ML: ABNORMAL
PH UR STRIP.AUTO: 7 [PH] (ref 5–8)
PH UR STRIP: 7 [PH]
PLATELET # BLD AUTO: 332 K/UL (ref 135–450)
PMV BLD AUTO: 8.5 FL (ref 5–10.5)
POTASSIUM SERPL-SCNC: 3.2 MMOL/L (ref 3.5–5.1)
PROT SERPL-MCNC: 7.1 G/DL (ref 6.4–8.2)
PROT UR STRIP.AUTO-MCNC: 30 MG/DL
RBC # BLD AUTO: 3.9 M/UL (ref 4–5.2)
RBC #/AREA URNS HPF: ABNORMAL /HPF (ref 0–4)
SALICYLATES SERPL-MCNC: <0.3 MG/DL (ref 15–30)
SODIUM SERPL-SCNC: 139 MMOL/L (ref 136–145)
SP GR UR STRIP.AUTO: 1.02 (ref 1–1.03)
TROPONIN, HIGH SENSITIVITY: <6 NG/L (ref 0–14)
UA COMPLETE W REFLEX CULTURE PNL UR: ABNORMAL
UA DIPSTICK W REFLEX MICRO PNL UR: YES
URN SPEC COLLECT METH UR: ABNORMAL
UROBILINOGEN UR STRIP-ACNC: 0.2 E.U./DL
WBC # BLD AUTO: 9.7 K/UL (ref 4–11)
WBC #/AREA URNS HPF: ABNORMAL /HPF (ref 0–5)

## 2024-09-28 PROCEDURE — 80053 COMPREHEN METABOLIC PANEL: CPT

## 2024-09-28 PROCEDURE — 6370000000 HC RX 637 (ALT 250 FOR IP): Performed by: EMERGENCY MEDICINE

## 2024-09-28 PROCEDURE — 85025 COMPLETE CBC W/AUTO DIFF WBC: CPT

## 2024-09-28 PROCEDURE — 84443 ASSAY THYROID STIM HORMONE: CPT

## 2024-09-28 PROCEDURE — 80179 DRUG ASSAY SALICYLATE: CPT

## 2024-09-28 PROCEDURE — 81001 URINALYSIS AUTO W/SCOPE: CPT

## 2024-09-28 PROCEDURE — 83735 ASSAY OF MAGNESIUM: CPT

## 2024-09-28 PROCEDURE — 99285 EMERGENCY DEPT VISIT HI MDM: CPT

## 2024-09-28 PROCEDURE — 80143 DRUG ASSAY ACETAMINOPHEN: CPT

## 2024-09-28 PROCEDURE — 83690 ASSAY OF LIPASE: CPT

## 2024-09-28 PROCEDURE — 82077 ASSAY SPEC XCP UR&BREATH IA: CPT

## 2024-09-28 PROCEDURE — 71046 X-RAY EXAM CHEST 2 VIEWS: CPT

## 2024-09-28 PROCEDURE — 80307 DRUG TEST PRSMV CHEM ANLYZR: CPT

## 2024-09-28 PROCEDURE — 84703 CHORIONIC GONADOTROPIN ASSAY: CPT

## 2024-09-28 PROCEDURE — 84484 ASSAY OF TROPONIN QUANT: CPT

## 2024-09-28 RX ORDER — FAMOTIDINE 20 MG/1
40 TABLET, FILM COATED ORAL ONCE
Status: COMPLETED | OUTPATIENT
Start: 2024-09-28 | End: 2024-09-28

## 2024-09-28 RX ORDER — ACETAMINOPHEN 325 MG/1
650 TABLET ORAL ONCE
Status: COMPLETED | OUTPATIENT
Start: 2024-09-28 | End: 2024-09-28

## 2024-09-28 RX ADMIN — ACETAMINOPHEN 650 MG: 325 TABLET ORAL at 22:57

## 2024-09-28 RX ADMIN — FAMOTIDINE 40 MG: 20 TABLET ORAL at 22:57

## 2024-09-28 ASSESSMENT — PAIN DESCRIPTION - LOCATION: LOCATION: ABDOMEN

## 2024-09-28 ASSESSMENT — LIFESTYLE VARIABLES
HOW MANY STANDARD DRINKS CONTAINING ALCOHOL DO YOU HAVE ON A TYPICAL DAY: PATIENT DOES NOT DRINK
HOW OFTEN DO YOU HAVE A DRINK CONTAINING ALCOHOL: NEVER

## 2024-09-28 ASSESSMENT — PAIN - FUNCTIONAL ASSESSMENT: PAIN_FUNCTIONAL_ASSESSMENT: NONE - DENIES PAIN

## 2024-09-28 ASSESSMENT — PAIN SCALES - GENERAL: PAINLEVEL_OUTOF10: 5

## 2024-09-29 PROBLEM — F43.10 PTSD (POST-TRAUMATIC STRESS DISORDER): Status: ACTIVE | Noted: 2024-09-29

## 2024-09-29 PROBLEM — F33.9 MAJOR DEPRESSION, RECURRENT (HCC): Status: ACTIVE | Noted: 2024-09-29

## 2024-09-29 PROBLEM — F32.2 CURRENT SEVERE EPISODE OF MAJOR DEPRESSIVE DISORDER WITHOUT PSYCHOTIC FEATURES (HCC): Status: ACTIVE | Noted: 2024-09-29

## 2024-09-29 PROBLEM — F60.9 PERSONALITY DISORDER, UNSPECIFIED (HCC): Status: ACTIVE | Noted: 2024-09-29

## 2024-09-29 PROBLEM — R45.851 SUICIDAL IDEATION: Status: ACTIVE | Noted: 2024-09-29

## 2024-09-29 PROBLEM — F84.0 AUTISTIC SPECTRUM DISORDER: Status: ACTIVE | Noted: 2024-09-29

## 2024-09-29 PROBLEM — D64.9 ANEMIA: Status: ACTIVE | Noted: 2024-09-29

## 2024-09-29 PROBLEM — F33.9 MAJOR DEPRESSIVE DISORDER, RECURRENT (HCC): Status: ACTIVE | Noted: 2024-09-29

## 2024-09-29 LAB
ANION GAP SERPL CALCULATED.3IONS-SCNC: 9 MMOL/L (ref 3–16)
BUN SERPL-MCNC: 10 MG/DL (ref 7–20)
CALCIUM SERPL-MCNC: 8.8 MG/DL (ref 8.3–10.6)
CHLORIDE SERPL-SCNC: 104 MMOL/L (ref 99–110)
CO2 SERPL-SCNC: 25 MMOL/L (ref 21–32)
CREAT SERPL-MCNC: 0.7 MG/DL (ref 0.6–1.1)
EKG ATRIAL RATE: 77 BPM
EKG DIAGNOSIS: NORMAL
EKG P AXIS: 62 DEGREES
EKG P-R INTERVAL: 156 MS
EKG Q-T INTERVAL: 412 MS
EKG QRS DURATION: 76 MS
EKG QTC CALCULATION (BAZETT): 466 MS
EKG R AXIS: 34 DEGREES
EKG T AXIS: 17 DEGREES
EKG VENTRICULAR RATE: 77 BPM
GFR SERPLBLD CREATININE-BSD FMLA CKD-EPI: >90 ML/MIN/{1.73_M2}
GLUCOSE SERPL-MCNC: 73 MG/DL (ref 70–99)
MAGNESIUM SERPL-MCNC: 2.3 MG/DL (ref 1.8–2.4)
POTASSIUM SERPL-SCNC: 3.5 MMOL/L (ref 3.5–5.1)
SODIUM SERPL-SCNC: 138 MMOL/L (ref 136–145)
TSH SERPL DL<=0.005 MIU/L-ACNC: 2.35 UIU/ML (ref 0.27–4.2)

## 2024-09-29 PROCEDURE — 82607 VITAMIN B-12: CPT

## 2024-09-29 PROCEDURE — 6370000000 HC RX 637 (ALT 250 FOR IP)

## 2024-09-29 PROCEDURE — 83550 IRON BINDING TEST: CPT

## 2024-09-29 PROCEDURE — 83735 ASSAY OF MAGNESIUM: CPT

## 2024-09-29 PROCEDURE — 93010 ELECTROCARDIOGRAM REPORT: CPT | Performed by: STUDENT IN AN ORGANIZED HEALTH CARE EDUCATION/TRAINING PROGRAM

## 2024-09-29 PROCEDURE — 99222 1ST HOSP IP/OBS MODERATE 55: CPT

## 2024-09-29 PROCEDURE — 80061 LIPID PANEL: CPT

## 2024-09-29 PROCEDURE — 6370000000 HC RX 637 (ALT 250 FOR IP): Performed by: PSYCHIATRY & NEUROLOGY

## 2024-09-29 PROCEDURE — 93005 ELECTROCARDIOGRAM TRACING: CPT | Performed by: EMERGENCY MEDICINE

## 2024-09-29 PROCEDURE — 85025 COMPLETE CBC W/AUTO DIFF WBC: CPT

## 2024-09-29 PROCEDURE — 83036 HEMOGLOBIN GLYCOSYLATED A1C: CPT

## 2024-09-29 PROCEDURE — 1240000000 HC EMOTIONAL WELLNESS R&B

## 2024-09-29 PROCEDURE — 36415 COLL VENOUS BLD VENIPUNCTURE: CPT

## 2024-09-29 PROCEDURE — 99223 1ST HOSP IP/OBS HIGH 75: CPT | Performed by: PSYCHIATRY & NEUROLOGY

## 2024-09-29 PROCEDURE — 80048 BASIC METABOLIC PNL TOTAL CA: CPT

## 2024-09-29 PROCEDURE — 83540 ASSAY OF IRON: CPT

## 2024-09-29 PROCEDURE — 82746 ASSAY OF FOLIC ACID SERUM: CPT

## 2024-09-29 RX ORDER — OLANZAPINE 10 MG/1
10 TABLET ORAL EVERY 4 HOURS PRN
Status: DISCONTINUED | OUTPATIENT
Start: 2024-09-29 | End: 2024-10-02 | Stop reason: HOSPADM

## 2024-09-29 RX ORDER — NICOTINE 21 MG/24HR
1 PATCH, TRANSDERMAL 24 HOURS TRANSDERMAL DAILY
Status: DISCONTINUED | OUTPATIENT
Start: 2024-09-29 | End: 2024-10-02 | Stop reason: HOSPADM

## 2024-09-29 RX ORDER — IBUPROFEN 400 MG/1
400 TABLET, FILM COATED ORAL EVERY 6 HOURS PRN
Status: DISCONTINUED | OUTPATIENT
Start: 2024-09-29 | End: 2024-10-02 | Stop reason: HOSPADM

## 2024-09-29 RX ORDER — POLYETHYLENE GLYCOL 3350 17 G
2 POWDER IN PACKET (EA) ORAL
Status: DISCONTINUED | OUTPATIENT
Start: 2024-09-29 | End: 2024-10-02 | Stop reason: HOSPADM

## 2024-09-29 RX ORDER — LISDEXAMFETAMINE DIMESYLATE 30 MG/1
30 CAPSULE ORAL DAILY
Status: DISCONTINUED | OUTPATIENT
Start: 2024-09-29 | End: 2024-10-02 | Stop reason: HOSPADM

## 2024-09-29 RX ORDER — ACETAMINOPHEN 325 MG/1
650 TABLET ORAL EVERY 4 HOURS PRN
Status: DISCONTINUED | OUTPATIENT
Start: 2024-09-29 | End: 2024-10-02 | Stop reason: HOSPADM

## 2024-09-29 RX ORDER — MAGNESIUM HYDROXIDE/ALUMINUM HYDROXICE/SIMETHICONE 120; 1200; 1200 MG/30ML; MG/30ML; MG/30ML
30 SUSPENSION ORAL EVERY 6 HOURS PRN
Status: DISCONTINUED | OUTPATIENT
Start: 2024-09-29 | End: 2024-10-02 | Stop reason: HOSPADM

## 2024-09-29 RX ORDER — DEXTROAMPHETAMINE SACCHARATE, AMPHETAMINE ASPARTATE, DEXTROAMPHETAMINE SULFATE AND AMPHETAMINE SULFATE 2.5; 2.5; 2.5; 2.5 MG/1; MG/1; MG/1; MG/1
10 TABLET ORAL
Status: DISCONTINUED | OUTPATIENT
Start: 2024-09-29 | End: 2024-10-02 | Stop reason: HOSPADM

## 2024-09-29 RX ORDER — TRAZODONE HYDROCHLORIDE 50 MG/1
50 TABLET, FILM COATED ORAL NIGHTLY PRN
Status: DISCONTINUED | OUTPATIENT
Start: 2024-09-29 | End: 2024-10-01

## 2024-09-29 RX ORDER — HYDROXYZINE HYDROCHLORIDE 50 MG/1
50 TABLET, FILM COATED ORAL 3 TIMES DAILY PRN
Status: DISCONTINUED | OUTPATIENT
Start: 2024-09-29 | End: 2024-10-02 | Stop reason: HOSPADM

## 2024-09-29 RX ORDER — ESCITALOPRAM OXALATE 10 MG/1
20 TABLET ORAL DAILY
Status: DISCONTINUED | OUTPATIENT
Start: 2024-09-29 | End: 2024-10-02 | Stop reason: HOSPADM

## 2024-09-29 RX ORDER — BENZTROPINE MESYLATE 1 MG/ML
2 INJECTION, SOLUTION INTRAMUSCULAR; INTRAVENOUS 2 TIMES DAILY PRN
Status: DISCONTINUED | OUTPATIENT
Start: 2024-09-29 | End: 2024-10-02 | Stop reason: HOSPADM

## 2024-09-29 RX ORDER — LIDOCAINE 4 G/G
1 PATCH TOPICAL DAILY
Status: DISCONTINUED | OUTPATIENT
Start: 2024-09-29 | End: 2024-10-02 | Stop reason: HOSPADM

## 2024-09-29 RX ORDER — CETIRIZINE HYDROCHLORIDE 10 MG/1
10 TABLET ORAL DAILY
Status: DISCONTINUED | OUTPATIENT
Start: 2024-09-29 | End: 2024-10-02 | Stop reason: HOSPADM

## 2024-09-29 RX ADMIN — CETIRIZINE HYDROCHLORIDE 10 MG: 10 TABLET ORAL at 12:51

## 2024-09-29 RX ADMIN — LISDEXAMFETAMINE DIMESYLATE 30 MG: 30 CAPSULE ORAL at 12:51

## 2024-09-29 RX ADMIN — OMEPRAZOLE 20 MG: 20 CAPSULE, DELAYED RELEASE ORAL at 21:41

## 2024-09-29 RX ADMIN — DEXTROAMPHETAMINE SACCHARATE, AMPHETAMINE ASPARTATE, DEXTROAMPHETAMINE SULFATE, AMPHETAMINE SULFATE TABLETS, 10 MG,CLL 10 MG: 2.5; 2.5; 2.5; 2.5 TABLET ORAL at 16:48

## 2024-09-29 RX ADMIN — ESCITALOPRAM OXALATE 20 MG: 10 TABLET ORAL at 12:51

## 2024-09-29 ASSESSMENT — PATIENT HEALTH QUESTIONNAIRE - PHQ9
6. FEELING BAD ABOUT YOURSELF - OR THAT YOU ARE A FAILURE OR HAVE LET YOURSELF OR YOUR FAMILY DOWN: NEARLY EVERY DAY
SUM OF ALL RESPONSES TO PHQ QUESTIONS 1-9: 27
2. FEELING DOWN, DEPRESSED OR HOPELESS: NEARLY EVERY DAY
SUM OF ALL RESPONSES TO PHQ QUESTIONS 1-9: 27
SUM OF ALL RESPONSES TO PHQ9 QUESTIONS 1 & 2: 6
3. TROUBLE FALLING OR STAYING ASLEEP: NEARLY EVERY DAY
9. THOUGHTS THAT YOU WOULD BE BETTER OFF DEAD, OR OF HURTING YOURSELF: NEARLY EVERY DAY
8. MOVING OR SPEAKING SO SLOWLY THAT OTHER PEOPLE COULD HAVE NOTICED. OR THE OPPOSITE, BEING SO FIGETY OR RESTLESS THAT YOU HAVE BEEN MOVING AROUND A LOT MORE THAN USUAL: NEARLY EVERY DAY
SUM OF ALL RESPONSES TO PHQ QUESTIONS 1-9: 27
7. TROUBLE CONCENTRATING ON THINGS, SUCH AS READING THE NEWSPAPER OR WATCHING TELEVISION: NEARLY EVERY DAY
1. LITTLE INTEREST OR PLEASURE IN DOING THINGS: NEARLY EVERY DAY
4. FEELING TIRED OR HAVING LITTLE ENERGY: NEARLY EVERY DAY
5. POOR APPETITE OR OVEREATING: NEARLY EVERY DAY
10. IF YOU CHECKED OFF ANY PROBLEMS, HOW DIFFICULT HAVE THESE PROBLEMS MADE IT FOR YOU TO DO YOUR WORK, TAKE CARE OF THINGS AT HOME, OR GET ALONG WITH OTHER PEOPLE: EXTREMELY DIFFICULT
SUM OF ALL RESPONSES TO PHQ QUESTIONS 1-9: 24

## 2024-09-29 ASSESSMENT — SLEEP AND FATIGUE QUESTIONNAIRES
DO YOU USE A SLEEP AID: NO
DO YOU HAVE DIFFICULTY SLEEPING: YES
AVERAGE NUMBER OF SLEEP HOURS: 5
SLEEP PATTERN: DIFFICULTY FALLING ASLEEP;DIFFICULTY ARISING;RESTLESSNESS
AVERAGE NUMBER OF SLEEP HOURS: 5
DO YOU HAVE DIFFICULTY SLEEPING: YES
SLEEP PATTERN: DIFFICULTY FALLING ASLEEP;DIFFICULTY ARISING
DO YOU USE A SLEEP AID: NO

## 2024-09-29 ASSESSMENT — PAIN SCALES - GENERAL
PAINLEVEL_OUTOF10: 0
PAINLEVEL_OUTOF10: 0

## 2024-09-29 NOTE — ED PROVIDER NOTES
denies auditory visualizations or illicit drug use.       Screening labs were obtained for mental health evaluation.  EKG troponin and chest x-ray were also checked.     Screening labs not demonstrate any emergent or maladies.  Urine drug screen positive for cannabis.  Troponin within normal limits.  EKG is nondiagnostic for ACS.     Patient is medically cleared for mental health evaluation and transfer to another facility.     CONSULTS: (Who and What was discussed)  None          Chronic Conditions:   Past Medical History:   Diagnosis Date    Anxiety     Asthma     Depression     GERD (gastroesophageal reflux disease)     Headache          Records Reviewed (External and source): Reviewed previous emergency department visits.     Disposition Considerations (include 1 Tests not done, Admit vs D/C, Shared Decision Making, Pt Expectation of Test or Tx.): Patient has a mental health condition that require specialist management.       I am the Primary Clinician of Record.    FINAL IMPRESSION      1. Suicidal ideation          DISPOSITION/PLAN     DISPOSITION    Condition at Disposition: Data Unavailable      PATIENT REFERRED TO:  No follow-up provider specified.    DISCHARGE MEDICATIONS:  New Prescriptions    No medications on file       DISCONTINUED MEDICATIONS:  Discontinued Medications    No medications on file              (Please note that portions of this note were completed with a voice recognition program.  Efforts were made to edit the dictations but occasionally words are mis-transcribed.)    Jose Hernandez MD (electronically signed)            Jose Hernandez MD  09/29/24 0575

## 2024-09-29 NOTE — PROGRESS NOTES
CSSR-S Assessment completed with patient who then scored HIGH RISK.     Provider Dr. Levy was notified of HIGH RISK score, via Telephone at 0315.      Were suicide precautions ordered: [facility] then discontinued.    If not ordered, justification as follows: patient described friends and family whom she cares about that she wouldn't want to leave.    Completed by: Michelle RN/BC
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
## 2024-09-29 NOTE — H&P
Hospital Medicine History & Physical      PCP: Tamara Kirby    Date of Admission: 9/28/2024    Date of Service: Pt seen/examined on 9/29/2024     Chief Complaint:    Chief Complaint   Patient presents with    Suicidal     Per ems patient has complaints of suicidal, patient took a antidepressant and vomited in the back of ambulance per ems          History Of Present Illness:      The patient is a 29 y.o. female with pmhx of anxiety,depression who presented to [facility] for suicidal ideation.  Patient was seen and evaluated in the ED by the ED medical provider, patient was medically cleared for admission to [facility] at [facility].  This note serves as an admission medical H&P.    Tobacco use: None   ETOH use: Socially   Illicit drug use: Marijuana     Patient is complaining of intermittent pain/soreness underneath left breast. Thinks she \"has a floating rib\", is tender to palpation. No shortness of breath, does not radiate anywhere.  It has been waxing and waning for years.     Past Medical History:        Diagnosis Date    Anxiety     Asthma     Depression     GERD (gastroesophageal reflux disease)     Headache        Past Surgical History:        Procedure Laterality Date    ADENOIDECTOMY      REL OF TONGUE TIE AND CLOSURE WITH FLAP      TONSILLECTOMY      TYMPANOSTOMY TUBE PLACEMENT      9 SETS       Medications Prior to Admission:    Prior to Admission medications    Medication Sig Start Date End Date Taking? Authorizing Provider   amphetamine-dextroamphetamine (ADDERALL) 10 MG tablet Take 1 tablet by mouth daily.   Yes Shilo John MD   Escitalopram Oxalate (LEXAPRO PO) Take by mouth   Yes Shilo John MD   Lisdexamfetamine Dimesylate (VYVANSE PO) Take by mouth   Yes Shilo John MD   loratadine (CLARITIN) 10 MG tablet Take 1 tablet by mouth daily   Yes Shilo John MD   Omeprazole 20 MG TBDD Take by mouth   Yes ProviderShiol MD       Allergies:  Latex    Social History:

## 2024-09-29 NOTE — PROGRESS NOTES
Behavioral Services  Medicare Certification Upon Admission    I certify that this patient's inpatient psychiatric hospital admission is medically necessary for:    [x] (1) Treatment which could reasonably be expected to improve this patient's condition,       [x] (2) Or for diagnostic study;     AND     [x](2) The inpatient psychiatric services are provided while the individual is under the care of a physician and are included in the individualized plan of care.    Estimated length of stay/service 5 d    Plan for post-hospital care outpt    Electronically signed by MARIBEL KEARNEY MD on 9/29/2024 at 9:04 AM

## 2024-09-29 NOTE — PROGRESS NOTES
4 Eyes Skin Assessment     NAME:  Rose Coyle  YOB: 1995  MEDICAL RECORD NUMBER:  [id number]    The patient is being assessed for  Admission    I agree that at least one RN has performed a thorough Head to Toe Skin Assessment on the patient. ALL assessment sites listed below have been assessed.      Areas assessed by both nurses:    Head, Face, Ears, Shoulders, Back, Chest, Arms, Elbows, Hands, Sacrum. Buttock, Coccyx, Ischium, Legs. Feet and Heels, and Under Medical Devices         Does the Patient have a Wound? No noted wound(s)       Bon Prevention initiated by RN: No  Wound Care Orders initiated by RN: No    Pressure Injury (Stage 3,4, Unstageable, DTI, NWPT, and Complex wounds) if present, place Wound referral order by RN under : No    New Ostomies, if present place, Ostomy referral order under : No     Nurse 1 eSignature: Electronically signed by Michael Paul RN on 9/29/24 at 6:08 AM EDT    **SHARE this note so that the co-signing nurse can place an eSignature**    Nurse 2 eSignature: Electronically signed by Cheryl Ball RN on 9/29/24 at 6:09 AM EDT
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
## 2024-09-29 NOTE — ED NOTES
Value Date/Time    COLORU Yellow 09/28/2024 09:47 PM    PROTEINU 30 09/28/2024 09:47 PM    GLUCOSEU Negative 09/28/2024 09:47 PM    KETUA Negative 09/28/2024 09:47 PM    BILIRUBINUR Negative 09/28/2024 09:47 PM    BLOODU Negative 09/28/2024 09:47 PM    UROBILINOGEN 0.2 09/28/2024 09:47 PM    NITRU Negative 09/28/2024 09:47 PM    LEUKOCYTESUR Negative 09/28/2024 09:47 PM    WBCUA 6-9 09/28/2024 09:47 PM    RBCUA 5-10 09/28/2024 09:47 PM    BACTERIA 2+ 09/28/2024 09:47 PM     PREGNANCY TEST:   Lab Results   Component Value Date/Time    PREGTESTUR Negative 09/28/2024 11:19 PM       Dialysis: No    Target Destination: anywhere      Insurance: Payor: [facility] /  /  /      Current Psychotic Symptoms  Harmful Actions Towards Others:    Orientation Level:    Speech Pattern:    General Attitude:    Emotions:    Delusions:    Hallucination:       Any Suicidal Ideations: High Risk    Homicidal Ideations/Violence Risk:   Observed Violent Behavior: No  Homicidal Ideations:      Past Psychiatric History:       Diagnosis Date    Anxiety     Asthma     Depression     GERD (gastroesophageal reflux disease)     Headache        Hold (TDO/Involuntary Hold): Yes    The patient is not currently receiving care for the above psychiatric illness.     Home Medications  Does Patient Have Medications to Bring to the Facility: No  Medications Prior to Admission:   Prior to Admission Medications   Prescriptions Last Dose Informant Patient Reported? Taking?   Escitalopram Oxalate (LEXAPRO PO) 9/28/2024  Yes Yes   Sig: Take by mouth   Lisdexamfetamine Dimesylate (VYVANSE PO) 9/28/2024  Yes Yes   Sig: Take by mouth   Omeprazole 20 MG TBDD 9/28/2024 Self Yes Yes   Sig: Take by mouth   amphetamine-dextroamphetamine (ADDERALL) 10 MG tablet 9/28/2024  Yes Yes   Sig: Take 1 tablet by mouth daily.   loratadine (CLARITIN) 10 MG tablet 9/28/2024 Self Yes Yes   Sig: Take 1 tablet by mouth daily      Facility-Administered Medications: None
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
## 2024-09-29 NOTE — PROGRESS NOTES
[facility]  Admission Note     Admission Type:   Voluntary     Reason for admission:         Addictive Behavior:        Medical Problems:   Past Medical History:   Diagnosis Date    Anxiety     Asthma     Depression     GERD (gastroesophageal reflux disease)     Headache        Status EXAM:  Mental Status and Behavioral Exam  Normal: No  Level of Assistance: Independent/Self  Facial Expression: Flat  Affect: Congruent  Level of Consciousness: Alert  Frequency of Checks: 4 times per hour, close  Mood:Normal: No  Mood: Depressed, Helpless, Anxious  Motor Activity:Normal: No  Motor Activity: Decreased  Eye Contact: Good  Observed Behavior: Friendly, Cooperative  Sexual Misconduct History: Current - no  Preception: Scurry to person, Scurry to time, Scurry to place, Scurry to situation  Attention:Normal: No  Attention: Distractible  Thought Processes: Perseveration  Thought Content:Normal: No  Thought Content: Poverty of content  Depression Symptoms: Crying, Feelings of hopelessess, Feelings of helplessness, Isolative, Loss of interest, Sleep disturbance  Anxiety Symptoms: Generalized  Talya Symptoms: No problems reported or observed.  Hallucinations: None  Delusions: No  Memory:Normal: No  Insight and Judgment: No  Insight and Judgment: Poor judgment, Poor insight    Tobacco Screening:  Practical Counseling, on admission, melvin X, if applicable and completed (first 3 are required if patient doesn't refuse): X           ( ) Recognizing danger situations (included triggers and roadblocks)                    ( ) Coping skills (new ways to manage stress,relaxation techniques, changing routine, distraction)                                                           ( ) Basic information about quitting (benefits of quitting, techniques in how to quit, available resources  ( ) [facility]

## 2024-09-30 PROBLEM — F60.3 BORDERLINE PERSONALITY DISORDER (HCC): Status: ACTIVE | Noted: 2024-09-30

## 2024-09-30 LAB
BACTERIA GENITAL QL WET PREP: ABNORMAL
BASOPHILS # BLD: 0.1 K/UL (ref 0–0.2)
BASOPHILS NFR BLD: 0.7 %
BILIRUB UR QL STRIP.AUTO: NEGATIVE
CHOLEST SERPL-MCNC: 152 MG/DL (ref 0–199)
CLARITY UR: CLEAR
CLUE CELLS SPEC QL WET PREP: ABNORMAL
COLOR UR: NORMAL
DEPRECATED RDW RBC AUTO: 15.7 % (ref 12.4–15.4)
EOSINOPHIL # BLD: 0.2 K/UL (ref 0–0.6)
EOSINOPHIL NFR BLD: 2.9 %
EPI CELLS SPEC QL WET PREP: ABNORMAL
EST. AVERAGE GLUCOSE BLD GHB EST-MCNC: 102.5 MG/DL
FOLATE SERPL-MCNC: 11.5 NG/ML (ref 4.78–24.2)
GLUCOSE UR STRIP.AUTO-MCNC: NEGATIVE MG/DL
HBA1C MFR BLD: 5.2 %
HCT VFR BLD AUTO: 32.7 % (ref 36–48)
HDLC SERPL-MCNC: 61 MG/DL (ref 40–60)
HGB BLD-MCNC: 10.3 G/DL (ref 12–16)
HGB UR QL STRIP.AUTO: NEGATIVE
IRON SATN MFR SERPL: 4 % (ref 15–50)
IRON SERPL-MCNC: 16 UG/DL (ref 37–145)
KETONES UR STRIP.AUTO-MCNC: NEGATIVE MG/DL
LDLC SERPL CALC-MCNC: 79 MG/DL
LEUKOCYTE ESTERASE UR QL STRIP.AUTO: NEGATIVE
LYMPHOCYTES # BLD: 1.8 K/UL (ref 1–5.1)
LYMPHOCYTES NFR BLD: 24 %
MCH RBC QN AUTO: 25.4 PG (ref 26–34)
MCHC RBC AUTO-ENTMCNC: 31.4 G/DL (ref 31–36)
MCV RBC AUTO: 80.7 FL (ref 80–100)
MONOCYTES # BLD: 0.6 K/UL (ref 0–1.3)
MONOCYTES NFR BLD: 8.3 %
NEUTROPHILS # BLD: 4.9 K/UL (ref 1.7–7.7)
NEUTROPHILS NFR BLD: 64.1 %
NITRITE UR QL STRIP.AUTO: NEGATIVE
PH UR STRIP.AUTO: 6 [PH] (ref 5–8)
PLATELET # BLD AUTO: 301 K/UL (ref 135–450)
PMV BLD AUTO: 10.1 FL (ref 5–10.5)
PROT UR STRIP.AUTO-MCNC: NEGATIVE MG/DL
RBC # BLD AUTO: 4.05 M/UL (ref 4–5.2)
RBC SPEC QL WET PREP: ABNORMAL
SP GR UR STRIP.AUTO: 1.01 (ref 1–1.03)
SPECIMEN SOURCE FLD: ABNORMAL
T VAGINALIS GENITAL QL WET PREP: ABNORMAL
TIBC SERPL-MCNC: 410 UG/DL (ref 260–445)
TRIGL SERPL-MCNC: 60 MG/DL (ref 0–150)
UA COMPLETE W REFLEX CULTURE PNL UR: NORMAL
UA DIPSTICK W REFLEX MICRO PNL UR: NORMAL
URN SPEC COLLECT METH UR: NORMAL
UROBILINOGEN UR STRIP-ACNC: 0.2 E.U./DL
VIT B12 SERPL-MCNC: 651 PG/ML (ref 211–911)
VLDLC SERPL CALC-MCNC: 12 MG/DL
WBC # BLD AUTO: 7.7 K/UL (ref 4–11)
WBC SPEC QL WET PREP: ABNORMAL
YEAST GENITAL QL WET PREP: ABNORMAL

## 2024-09-30 PROCEDURE — 0065U SYFLS TST NONTREPONEMAL ANTB: CPT

## 2024-09-30 PROCEDURE — 87210 SMEAR WET MOUNT SALINE/INK: CPT

## 2024-09-30 PROCEDURE — 87591 N.GONORRHOEAE DNA AMP PROB: CPT

## 2024-09-30 PROCEDURE — 99233 SBSQ HOSP IP/OBS HIGH 50: CPT | Performed by: PSYCHIATRY & NEUROLOGY

## 2024-09-30 PROCEDURE — 87491 CHLMYD TRACH DNA AMP PROBE: CPT

## 2024-09-30 PROCEDURE — 87390 HIV-1 AG IA: CPT

## 2024-09-30 PROCEDURE — 86780 TREPONEMA PALLIDUM: CPT

## 2024-09-30 PROCEDURE — 36415 COLL VENOUS BLD VENIPUNCTURE: CPT

## 2024-09-30 PROCEDURE — 1240000000 HC EMOTIONAL WELLNESS R&B

## 2024-09-30 PROCEDURE — 6370000000 HC RX 637 (ALT 250 FOR IP): Performed by: PSYCHIATRY & NEUROLOGY

## 2024-09-30 PROCEDURE — 86702 HIV-2 ANTIBODY: CPT

## 2024-09-30 PROCEDURE — 86701 HIV-1ANTIBODY: CPT

## 2024-09-30 PROCEDURE — 81003 URINALYSIS AUTO W/O SCOPE: CPT

## 2024-09-30 RX ORDER — FERROUS SULFATE 325(65) MG
325 TABLET ORAL
Status: DISCONTINUED | OUTPATIENT
Start: 2024-10-01 | End: 2024-10-02 | Stop reason: HOSPADM

## 2024-09-30 RX ADMIN — DEXTROAMPHETAMINE SACCHARATE, AMPHETAMINE ASPARTATE, DEXTROAMPHETAMINE SULFATE, AMPHETAMINE SULFATE TABLETS, 10 MG,CLL 10 MG: 2.5; 2.5; 2.5; 2.5 TABLET ORAL at 16:28

## 2024-09-30 RX ADMIN — ESCITALOPRAM OXALATE 20 MG: 10 TABLET ORAL at 09:14

## 2024-09-30 RX ADMIN — OMEPRAZOLE 20 MG: 20 CAPSULE, DELAYED RELEASE ORAL at 20:26

## 2024-09-30 RX ADMIN — CETIRIZINE HYDROCHLORIDE 10 MG: 10 TABLET ORAL at 09:14

## 2024-09-30 RX ADMIN — LISDEXAMFETAMINE DIMESYLATE 30 MG: 30 CAPSULE ORAL at 09:14

## 2024-09-30 RX ADMIN — ACETAMINOPHEN 650 MG: 325 TABLET ORAL at 14:56

## 2024-09-30 RX ADMIN — IBUPROFEN 400 MG: 400 TABLET, FILM COATED ORAL at 11:20

## 2024-09-30 ASSESSMENT — PAIN DESCRIPTION - ORIENTATION
ORIENTATION: MID
ORIENTATION: MID
ORIENTATION: UPPER
ORIENTATION: UPPER

## 2024-09-30 ASSESSMENT — PAIN DESCRIPTION - FREQUENCY
FREQUENCY: CONTINUOUS

## 2024-09-30 ASSESSMENT — PAIN DESCRIPTION - DESCRIPTORS
DESCRIPTORS: ACHING;SHARP
DESCRIPTORS: ACHING
DESCRIPTORS: ACHING

## 2024-09-30 ASSESSMENT — PAIN DESCRIPTION - LOCATION
LOCATION: HEAD

## 2024-09-30 ASSESSMENT — PAIN DESCRIPTION - PAIN TYPE
TYPE: ACUTE PAIN
TYPE: OTHER (COMMENT)
TYPE: ACUTE PAIN

## 2024-09-30 ASSESSMENT — PAIN - FUNCTIONAL ASSESSMENT
PAIN_FUNCTIONAL_ASSESSMENT: ACTIVITIES ARE NOT PREVENTED

## 2024-09-30 ASSESSMENT — PAIN SCALES - GENERAL
PAINLEVEL_OUTOF10: 4
PAINLEVEL_OUTOF10: 3
PAINLEVEL_OUTOF10: 4
PAINLEVEL_OUTOF10: 10

## 2024-09-30 ASSESSMENT — PAIN DESCRIPTION - ONSET
ONSET: ON-GOING
ONSET: ON-GOING

## 2024-09-30 NOTE — GROUP NOTE
Group Therapy Note    Date: 9/30/2024    Group Start Time: 1100  Group End Time: 1145  Group Topic: Cognitive Skills    [facility]    Luisana Palencia LPC    Group members engaged in discussion about cognitive distortions. Members explored distortions and effects on mental health. Group members identified personal connections to distortions.     Group Therapy Note    Attendees: 8       Notes:  Patient was present throughout most of the duration of the group. Patient engaged in activity and interacted with peers appropriately.     Status After Intervention:  Improved    Participation Level: Active Listener and Interactive    Participation Quality: Appropriate, Attentive, Sharing, and Supportive      Speech:  normal      Thought Process/Content: Linear      Affective Functioning: Congruent      Mood: depressed      Level of consciousness:  Alert      Response to Learning: Able to verbalize current knowledge/experience and Able to verbalize/acknowledge new learning      Endings: None Reported    Modes of Intervention: Education, Support, Socialization, and Exploration      Discipline Responsible: /Counselor      Signature:  Luisana Palencia LPC

## 2024-09-30 NOTE — BH NOTE
Received Tylenol 650 mg PO to aid in decreasing headache. Reports this is a \"menstrual\" headache. Offered an ice pk and Rose declined the ice. Lights dimmed in assigned room.

## 2024-09-30 NOTE — BH NOTE
Clinician talked to the patient's therapist Lucia with the patient on the phone about discharge, housing and she stated if she was discharged today, she will kill herself. Clinician talked to her about her options and therapist asked her about several places she can go. She said she didn't want to go to a homeless shelter, because it is not a good environment for her. She said she can go to her moms house but she has rules. Clinician let her know that her mom has rules and so does the homeless shelter and she has a choice to make as an adult. She got upset and said again that she will kill herself if she is discharged. Clinician had her go back to her room with another  and stayed on the phone with her therapist. Clinician provided her personal cell phone to the therapist and asked if she can have her  come see her while she is in the hospital so we can plan a safe discharge.
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
## 2024-09-30 NOTE — GROUP NOTE
Group Therapy Note    Date: 9/30/2024    Group Start Time: 1000  Group End Time: 1045  Group Topic: Cognitive Skills    [facility]    Maryann Do BSW     Group members engaged in ways to identify self-sabotaging behaviors and ways to eliminate self-limitating behaviors. Group members participated in exploring thinking styles and reframing their thoughts.    Group Therapy Note    Attendees: 7         Notes:  Pt was present throughout the duration of the group. Pt participated in group activity and interacted appropriately with peers.    Status After Intervention:  Improved    Participation Level: Active Listener and Interactive    Participation Quality: Appropriate, Attentive, Sharing, and Supportive      Speech:  normal      Thought Process/Content: Logical      Affective Functioning: Congruent      Mood: euthymic      Level of consciousness:  Alert and Attentive      Response to Learning: Able to verbalize current knowledge/experience, Able to verbalize/acknowledge new learning, Able to retain information, Capable of insight, and Progressing to goal      Endings: None Reported    Modes of Intervention: Education, Support, Socialization, Exploration, Clarifying, Problem-solving, and Activity      Discipline Responsible: /Counselor      Signature:  MOLINA Franco

## 2024-09-30 NOTE — PROGRESS NOTES
Patient came to desk requesting medication for a headache.  She rates her pain a 10 on a 1-10 scale.  Ibuprofen offered and she was agreeable.  Ibuprofen given for pain.  See MAR.  Primary Nurse notified.

## 2024-09-30 NOTE — BH NOTE
Cooperative with lab draw. Inst on need for urine specimen and placed a collection container in commode.

## 2024-09-30 NOTE — BH NOTE
Clinician talked to the patient for Zoe  at [facility] Phone: [phone]. She had several questions for this clinician to ask the patient and patient gave responses that clinician wanted to report. Clinician gave patient the number to [facility] in [place] so she can call to have her paperwork released to [facility] for the autism diagnosis.    36.8

## 2024-09-30 NOTE — BH NOTE
Out to the dinning area social with select peers. Reported headache is the same \"but I'm doing OK'.

## 2024-10-01 LAB
C TRACH DNA UR QL NAA+PROBE: NEGATIVE
HIV 1+2 AB+HIV1 P24 AG SERPL QL IA: NORMAL
HIV 2 AB SERPL QL IA: NORMAL
HIV1 AB SERPL QL IA: NORMAL
HIV1 P24 AG SERPL QL IA: NORMAL
N GONORRHOEA DNA UR QL NAA+PROBE: NEGATIVE
REAGIN+T PALLIDUM IGG+IGM SERPL-IMP: NORMAL

## 2024-10-01 PROCEDURE — 6370000000 HC RX 637 (ALT 250 FOR IP): Performed by: PSYCHIATRY & NEUROLOGY

## 2024-10-01 PROCEDURE — 1240000000 HC EMOTIONAL WELLNESS R&B

## 2024-10-01 PROCEDURE — 6370000000 HC RX 637 (ALT 250 FOR IP)

## 2024-10-01 PROCEDURE — 99233 SBSQ HOSP IP/OBS HIGH 50: CPT | Performed by: PSYCHIATRY & NEUROLOGY

## 2024-10-01 RX ADMIN — DEXTROAMPHETAMINE SACCHARATE, AMPHETAMINE ASPARTATE, DEXTROAMPHETAMINE SULFATE, AMPHETAMINE SULFATE TABLETS, 10 MG,CLL 10 MG: 2.5; 2.5; 2.5; 2.5 TABLET ORAL at 16:45

## 2024-10-01 RX ADMIN — LISDEXAMFETAMINE DIMESYLATE 30 MG: 30 CAPSULE ORAL at 09:18

## 2024-10-01 RX ADMIN — OMEPRAZOLE 20 MG: 20 CAPSULE, DELAYED RELEASE ORAL at 21:24

## 2024-10-01 RX ADMIN — IBUPROFEN 400 MG: 400 TABLET, FILM COATED ORAL at 21:24

## 2024-10-01 RX ADMIN — CETIRIZINE HYDROCHLORIDE 10 MG: 10 TABLET ORAL at 09:18

## 2024-10-01 RX ADMIN — FERROUS SULFATE TAB 325 MG (65 MG ELEMENTAL FE) 325 MG: 325 (65 FE) TAB at 09:18

## 2024-10-01 RX ADMIN — ESCITALOPRAM OXALATE 20 MG: 10 TABLET ORAL at 09:18

## 2024-10-01 ASSESSMENT — PAIN DESCRIPTION - LOCATION
LOCATION: RIB CAGE
LOCATION: MOUTH
LOCATION: MOUTH

## 2024-10-01 ASSESSMENT — PAIN DESCRIPTION - DESCRIPTORS
DESCRIPTORS: ACHING

## 2024-10-01 ASSESSMENT — PAIN DESCRIPTION - FREQUENCY
FREQUENCY: CONTINUOUS
FREQUENCY: INTERMITTENT

## 2024-10-01 ASSESSMENT — PAIN SCALES - GENERAL
PAINLEVEL_OUTOF10: 1
PAINLEVEL_OUTOF10: 5
PAINLEVEL_OUTOF10: 0

## 2024-10-01 ASSESSMENT — PAIN - FUNCTIONAL ASSESSMENT
PAIN_FUNCTIONAL_ASSESSMENT: ACTIVITIES ARE NOT PREVENTED

## 2024-10-01 ASSESSMENT — PAIN SCALES - WONG BAKER
WONGBAKER_NUMERICALRESPONSE: NO HURT

## 2024-10-01 ASSESSMENT — PAIN DESCRIPTION - ONSET
ONSET: GRADUAL
ONSET: ON-GOING

## 2024-10-01 ASSESSMENT — PAIN DESCRIPTION - PAIN TYPE
TYPE: CHRONIC PAIN
TYPE: ACUTE PAIN

## 2024-10-01 ASSESSMENT — PAIN DESCRIPTION - ORIENTATION: ORIENTATION: LEFT

## 2024-10-01 NOTE — BH NOTE
Patient alert and oriented x 3. Patient rates Depression 8/10 and Anxiety 9/10. Patient visible on the milieu coloring. Patient denies SI/HI/A/V/H. Patient took HS medication. Patient denies self harm. No c/o's voiced @ present.

## 2024-10-01 NOTE — BH NOTE
Clinician called the patient's  from [employer] Mandie Sherly [phone] to update her on the questions she wanted this clinician to ask the patient. Mandie stated that she could come to visit the patient today and clinician told her to come anytime and she will make time to sit down with both of them.     Mandie arrived and this clinicain, the patient, Mandie and intern Elena all went into the room to discuss the options for the patient.     Mandie told the patient that she will take her to [place] to get the records she needs to file for disability and [employer] will pay the fee for medical records. Patient shared that she has an autism diagnosis that would help patient qualify for disability but has not produced it to Mandie yet. Yesterday, this clinician gave patient step by step instructions to contact [facility], ask for a FARIDA to be faxed to this clinician's fax number. This morning, clinician let her know it had not came through yet and patient said she talked to them last night and they stated they would fax it again. Patient stated she will call them back again. By the time Mandie arrived for the meeting, clinician let her know it still has not came. Patient stated she left a message both times.     Mandie discussed her therapist Lucia, having to reschedule her appointment too many times with the patient. She explained that Lucia has a quota to meet and when she keeps canceling appointments, she could be helping someone else during that time.     Mandie has a referral for Occupational therapy to help with the fine motor skills of an Autisim diagnosis and helping with making routines. She has set up the appointment.     Mandie has put in a referral to [employer] and explained that it will take months for it to go through. She also explained that [employer] is on a freeze until January due to lack of funds. She gave clear instructions for the patient to connect with [facility] in [place] to

## 2024-10-01 NOTE — GROUP NOTE
Group Therapy Note    Date: 10/1/2024    Group Start Time: 1100  Group End Time: 1145  Group Topic: Psychoeducation    [facility]    Micaela Hernandez        Group Therapy Note    Attendees: 8    For Psych Education session, group members learned about third spaces and why they are crucial in recovery.  Participants processed various examples of third spaces and discussed the aspects that makes these places therapeutic.  Group members discussed ways to improve their social health and ways to connect with their support system through the use of third spaces.    Target outcomes of group included increasing leisure education, improving coping skills, increasing problem-solving, improving social connection, reducing stress/anxiety, and improving overall health and well-being.      Notes:  Pt was present and engaged in session.  Attentive during group discussion and shared personal leisure interests.  Receptive to information provided and met goal for group.    Status After Intervention:  Improved    Participation Level: Active Listener and Interactive    Participation Quality: Appropriate, Attentive, and Sharing      Speech:  normal      Thought Process/Content: Linear      Affective Functioning: Congruent      Mood: anxious      Level of consciousness:  Alert and Attentive      Response to Learning: Able to verbalize current knowledge/experience, Able to verbalize/acknowledge new learning, and Progressing to goal      Endings: None Reported    Modes of Intervention: Education, Support, Socialization, Exploration, Clarifying, Problem-solving, and Activity      Discipline Responsible: Psychoeducational Specialist and Recreational Therapist      Signature:  Micaela Hernandez MA, CTRS

## 2024-10-01 NOTE — GROUP NOTE
Group Therapy Note    Date: 10/1/2024    Group Start Time: 1000  Group End Time: 1045  Group Topic: Psychoeducation    [facility]    Amalia Linton LISW        Group Therapy Note    Attendees: 9       Patient's Goal:  to learn and discuss the communication styles of being assertive, passive and aggressive. Discussed that communicating in an assertive manner is the healthiest way to communicate. Pt's asked to apply to their lives. Pt's also given handout on the communication styles.     Notes:  pt attended group for the full duration. She actively participated in group discussion and was able to apply to herself.    Status After Intervention:  Improved    Participation Level: Active Listener and Interactive    Participation Quality: Appropriate, Attentive, and Sharing      Speech:  normal      Thought Process/Content: Logical  Linear      Affective Functioning: Congruent      Mood: depressed      Level of consciousness:  Alert and Attentive      Response to Learning: Able to verbalize current knowledge/experience and Able to verbalize/acknowledge new learning      Endings: None Reported    Modes of Intervention: Education, Support, Socialization, Exploration, and Clarifying      Discipline Responsible: /Counselor      Signature:  ZAK Kirkpatrick
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
## 2024-10-02 VITALS
OXYGEN SATURATION: 98 % | SYSTOLIC BLOOD PRESSURE: 128 MMHG | DIASTOLIC BLOOD PRESSURE: 86 MMHG | BODY MASS INDEX: 28.35 KG/M2 | HEIGHT: 63 IN | TEMPERATURE: 98 F | RESPIRATION RATE: 18 BRPM | HEART RATE: 79 BPM | WEIGHT: 160 LBS

## 2024-10-02 PROCEDURE — 6370000000 HC RX 637 (ALT 250 FOR IP)

## 2024-10-02 PROCEDURE — 5130000000 HC BRIDGE APPOINTMENT

## 2024-10-02 PROCEDURE — 6370000000 HC RX 637 (ALT 250 FOR IP): Performed by: PSYCHIATRY & NEUROLOGY

## 2024-10-02 RX ORDER — LISDEXAMFETAMINE DIMESYLATE 30 MG/1
30 CAPSULE ORAL DAILY
Qty: 7 CAPSULE | Refills: 0 | Status: SHIPPED | OUTPATIENT
Start: 2024-10-02 | End: 2024-10-09

## 2024-10-02 RX ORDER — LIDOCAINE 4 G/G
1 PATCH TOPICAL DAILY
Qty: 30 PATCH | Refills: 0 | Status: SHIPPED | OUTPATIENT
Start: 2024-10-03

## 2024-10-02 RX ORDER — ESCITALOPRAM OXALATE 20 MG/1
20 TABLET ORAL DAILY
Qty: 30 TABLET | Refills: 0 | Status: SHIPPED | OUTPATIENT
Start: 2024-10-02

## 2024-10-02 RX ORDER — FERROUS SULFATE 325(65) MG
325 TABLET ORAL
Qty: 30 TABLET | Refills: 0 | Status: SHIPPED | OUTPATIENT
Start: 2024-10-03

## 2024-10-02 RX ORDER — LORATADINE 10 MG/1
10 TABLET ORAL DAILY
Qty: 30 TABLET | Refills: 0 | Status: SHIPPED | OUTPATIENT
Start: 2024-10-02

## 2024-10-02 RX ORDER — DEXTROAMPHETAMINE SACCHARATE, AMPHETAMINE ASPARTATE, DEXTROAMPHETAMINE SULFATE AND AMPHETAMINE SULFATE 2.5; 2.5; 2.5; 2.5 MG/1; MG/1; MG/1; MG/1
10 TABLET ORAL
Qty: 7 TABLET | Refills: 0 | Status: SHIPPED | OUTPATIENT
Start: 2024-10-02 | End: 2024-10-09

## 2024-10-02 RX ADMIN — ESCITALOPRAM OXALATE 20 MG: 10 TABLET ORAL at 08:20

## 2024-10-02 RX ADMIN — CETIRIZINE HYDROCHLORIDE 10 MG: 10 TABLET ORAL at 08:20

## 2024-10-02 RX ADMIN — FERROUS SULFATE TAB 325 MG (65 MG ELEMENTAL FE) 325 MG: 325 (65 FE) TAB at 08:20

## 2024-10-02 RX ADMIN — LISDEXAMFETAMINE DIMESYLATE 30 MG: 30 CAPSULE ORAL at 08:20

## 2024-10-02 ASSESSMENT — PAIN SCALES - GENERAL: PAINLEVEL_OUTOF10: 0

## 2024-10-02 ASSESSMENT — PAIN SCALES - WONG BAKER: WONGBAKER_NUMERICALRESPONSE: NO HURT

## 2024-10-02 ASSESSMENT — LIFESTYLE VARIABLES
HOW OFTEN DO YOU HAVE A DRINK CONTAINING ALCOHOL: NEVER
HOW MANY STANDARD DRINKS CONTAINING ALCOHOL DO YOU HAVE ON A TYPICAL DAY: PATIENT DOES NOT DRINK

## 2024-10-02 NOTE — PROGRESS NOTES
Group Therapy Note    Date: 10/1/2024  Start Time: 20:00  End Time:  21:00  Number of Participants: 9    Type of Group: Recreational  wrap up    Wellness Binder Information  Module Name:  /  Session Number:  /    Patient's Goal:  coping skills    Notes:  continuing to work on goal    Status After Intervention:  Unchanged    Participation Level: Active Listener and Interactive    Participation Quality: Appropriate, Attentive, and Sharing      Speech:  pressured      Thought Process/Content: Logical      Affective Functioning: Blunted      Mood: anxious      Level of consciousness:  Alert and Attentive      Response to Learning: Able to change behavior      Endings: None Reported    Modes of Intervention: Socialization and Problem-solving      Discipline Responsible: Behavorial Health Tech      Signature:  Ramsey Calle

## 2024-10-02 NOTE — PROGRESS NOTES
[facility]  Discharge Note    Pt discharged with followings belongings:   Dental Appliances: None  Vision - Corrective Lenses: Eyeglasses  Hearing Aid: None  Jewelry: None  Body Piercings Removed: No  Clothing: Footwear, Pants, Shirt, Socks  Other Valuables: Keys (in safe)   Valuables sent home with patient or returned to patient. Patient educated on aftercare instructions: yes .Patient verbalize understanding of AVS:  Yes.    Status EXAM upon discharge:  Mental Status and Behavioral Exam  Normal: No  Level of Assistance: Independent/Self  Facial Expression: Brightened  Affect: Congruent  Level of Consciousness: Alert  Frequency of Checks: 4 times per hour, close  Mood:Normal: No  Mood: Anxious  Motor Activity:Normal: Yes  Motor Activity: Increased  Eye Contact: Good  Observed Behavior: Cooperative, Friendly  Sexual Misconduct History: Current - no  Involved In Any Sexual Misconduct With Others? : No  History of Sexually Inappropriate Behavior When Previously Hospitalized?: No  Uncontrollable/Compulsive Masturbation?: No  Difficulty Controlling Sexual Impulses?: No  Preception: [place] to person, [place] to time, [place] to place, [place] to situation  Attention:Normal: Yes  Attention: Distractible  Thought Processes: Circumstantial  Thought Content:Normal: Yes  Thought Content: Poverty of content, Paranoia, Ideas of influence  Depression Symptoms: Change in energy level, Feelings of helplessness  Anxiety Symptoms: Generalized  Talya Symptoms: Poor judgment  Hallucinations: None  Delusions: No  Delusions: Paranoid, Persecutory  Memory:Normal: Yes  Memory:  (Unremarkable)  Insight and Judgment: No  Insight and Judgment: Poor judgment, Poor insight    Tobacco Screening:  Practical Counseling, on admission, melvin X, if applicable and completed (first 3 are required if patient doesn't refuse):            ( ) Recognizing danger situations (included triggers and roadblocks)                    ( ) Coping skills
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
## 2024-10-02 NOTE — PROGRESS NOTES
Bridge Appointment completed: Reviewed Discharge Instructions with patient.    Patient verbalizes understanding and agreement with the discharge plan using the teachback method.     Referral for Outpatient Tobacco Cessation Counseling, upon discharge (melvin X if applicable and completed):    ( )  Hospital staff assisted patient to call Quit Line or faxed referral                                   during hospitalization                  ( )  Recognizing danger situations (included triggers and roadblocks), if not completed on admission                    ( )  Coping skills (new ways to manage stress, exercise, relaxation techniques, changing routine, distraction), if not completed on admission                                                           ( )  Basic information about quitting (benefits of quitting, techniques in how to quit, available resources, if not completed on admission  ( ) [facility]   ( X) Patient refused referral  ( X) Patient refused counseling  (X ) Patient refused smoking cessation medication upon discharge    Vaccinations (melvin X if applicable and completed):  ( ) Patient states already received influenza vaccine elsewhere  ( ) Patient received influenza vaccine during this hospitalization  ( ) Patient refused influenza vaccine at this time  ( X) Not offered

## 2024-10-02 NOTE — GROUP NOTE
Group Therapy Note    Date: 10/2/2024    Group Start Time: 1115  Group End Time: 1200  Group Topic: Group Therapy    [facility]    Sabas Vieyra MSW, KASIE        Group Therapy Note    SW used active listening to engage in conversation while completing a check in. After sharing and discussing the check in the group worked to discuss the skill of gratitude and how they can use it to engage positive thoughts, feelings, and behaviors. The group then completed a Gratitude Jenga activity to practice the skill of gratitude.      Attendees: 4     Notes:  Pt was present and alert for group. The Pt participated in and completed all of the activities.     Status After Intervention:  Improved    Participation Level: Active Listener and Interactive    Participation Quality: Appropriate, Attentive, and Sharing      Speech:  normal      Thought Process/Content: Logical      Affective Functioning: Congruent      Mood: euthymic      Level of consciousness:  Alert      Response to Learning: Able to verbalize current knowledge/experience and Able to verbalize/acknowledge new learning      Endings: None Reported    Modes of Intervention: Education, Support, and Exploration      Discipline Responsible: /Counselor      Signature:  YOMI Phan LSW

## 2024-10-02 NOTE — PROGRESS NOTES
Home Medication Reconciliation Status          [x] COMPLETE       Medication history has been reviewed and obtained from the following source(s):       [] patient/family verbal report             [] patient/family provided written list       [x] external pharmacy   [] external facility list         []  Provider notified that home medication reconciliation is complete          [] IN PROGRESS       Medication reconciliation marked in progress at this time due to:       [] patient/family poor historian      [] waiting arrival of family to clarify       [] waiting for accurate list        [] external pharmacy needs called      * Follow up is needed.          [] UNABLE TO ASSESS       Medication reconciliation is incomplete and unable to assess at this time due to:       [] critical patient condition   [] patient is unresponsive        [] no family available                       [] unknown pharmacy       [] anonymous patient          * Follow up is needed.      [] Pharmacy consult placed for medication reconciliation assistance   Additional comments:  Medication confirmed by Carmina at [facility] [phone] but per Carmina patient normally fills medication at [facility] on [address], [place] [phone].

## 2024-10-02 NOTE — BH NOTE
Clinician received a call from the patient's  from [facility] [phone]. She asked for an update on her discharge. Dr. Levy had just informed this clinician that the patient stated her mother is picking her up at visitation time today. Mandie verified the address and stated she will make all of the follow up appointments with the patient. She provided the fax number to have the discharge summary faxed to her, at [phone].

## 2024-10-02 NOTE — PROGRESS NOTES
Rose offered c/o pain that she rated at a 5. See MAR. She received ibuprofen 400 mg po for complaint of mouth pain that resulted after she accidentally bit the side of her mouth.

## 2024-10-02 NOTE — TRANSITION OF CARE
Behavioral Health Transition Record    Patient Name: Rose Coyle  YOB: 1995   Medical Record Number: [id number]  Date of Admission: 9/28/2024  9:22 PM   Date of Discharge: 10/2/2024    Attending Provider: Sae Levy MD   Discharging Provider:  Sae Levy MD   To contact this individual call [phone] and ask the  to page.  If unavailable, ask to be transferred to Behavioral Health Provider on call.  A Behavioral Health Provider will be available on call 24/7 and during holidays.    Primary Care Provider: Tamara Kirby    Allergies   Allergen Reactions    Latex Rash       Reason for Admission: The patient is a 29-year-old female, who presented to the ED at [facility] on 09/28/2024 with suicidal ideation and plan. The patient had a long history of depression, trauma, and autism. She states she was recently diagnosed with ADHD and has been on Vyvanse and Adderall as well. She apparently has been homeless on and off for extensive period of time. She stated that she was at her mom's the other day, and her mother during the rain made her pack up and kicked her out of the house. She stated that mother was angry with her because she \"broke the rules.\" The patient described herself as having \"housing and security.\" She was living in [place], where her mother is. She has been couch-surfing and living in various places over time. She stated that she had thoughts of jumping off a bridge. She stated that she has been taking her medication including Lexapro, Vyvanse, Adderall through a counselor and has numerous people involved in her care, including , therapist, and NPs in the [facility].     Admission Diagnosis: Major depressive disorder, recurrent (HCC) [F33.9]  Major depression, recurrent (HCC) [F33.9]    * No surgery found *    Results for orders placed or performed during the hospital encounter of 09/28/24   Wet prep, genital    Specimen:

## 2024-10-03 NOTE — DISCHARGE SUMMARY
Discharge Summary   Admit Date: 9/28/2024   Discharge Date:  10/2/2024    Condition at DC stable  Spent over 40 minutes with patient and staff on DCplanning with more than 50 % of time spent with patient discussing care  Final Dx: axis I:  Mood DO                              PTSD                              Autism  Axis 2: Borderline Personality  Disorder Primary   Vanessa 3: See Medical History    And Present on Admission:   Autistic spectrum disorder   PTSD (post-traumatic stress disorder)   Suicidal ideation   Anemia   Borderline personality disorder (HCC)     Axis 4: Problems related to the social environment  Axis 5:  On Admission: 41-50 serious symptoms At Discharge: 61-70 mild symptoms   All conditions on Axis 1 and Axis 2 and active problems on Axis 3 were treated while patient was hospitalized. (  Active Hospital Problems    Diagnosis Date Noted    Borderline personality disorder (HCC) [F60.3] 09/30/2024    Autistic spectrum disorder [F84.0] 09/29/2024    PTSD (post-traumatic stress disorder) [F43.10] 09/29/2024    Suicidal ideation [R45.851] 09/29/2024    Anemia [D64.9] 09/29/2024   )   Condition on DC  Mood and affect are stable and pt is not suicidal   VITALS:  /86   Pulse 79   Temp 98 °F (36.7 °C) (Oral)   Resp 18   Ht 1.6 m (5' 3\")   Wt 72.6 kg (160 lb)   SpO2 98%   BMI 28.34 kg/m²   Brief Summary Present Illness     CHIEF COMPLAINT:  Suicidality.     HISTORY OF PRESENT ILLNESS:  The patient is a 29-year-old female, who presented to the ED at [facility] on 09/28/2024 with suicidal ideation and plan.  The patient had a long history of depression, trauma, and autism.  She states she was recently diagnosed with ADHD and has been on Vyvanse and Adderall as well.  She apparently has been homeless on and off for extensive period of time.  She stated that she was at her mom's the other day, and her mother during the rain made her pack up and kicked her out of the house.  She stated that mother

## 2024-10-04 ENCOUNTER — FOLLOWUP TELEPHONE ENCOUNTER (OUTPATIENT)
Dept: PSYCHIATRY | Age: 29
End: 2024-10-04

## 2024-10-07 ENCOUNTER — FOLLOWUP TELEPHONE ENCOUNTER (OUTPATIENT)
Dept: PSYCHIATRY | Age: 29
End: 2024-10-07

## 2024-10-08 ENCOUNTER — FOLLOWUP TELEPHONE ENCOUNTER (OUTPATIENT)
Dept: PSYCHIATRY | Age: 29
End: 2024-10-08